# Patient Record
Sex: MALE | Race: BLACK OR AFRICAN AMERICAN | Employment: PART TIME | URBAN - METROPOLITAN AREA
[De-identification: names, ages, dates, MRNs, and addresses within clinical notes are randomized per-mention and may not be internally consistent; named-entity substitution may affect disease eponyms.]

---
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
## 2021-05-04 ENCOUNTER — HOSPITAL ENCOUNTER (EMERGENCY)
Facility: HOSPITAL | Age: 21
Discharge: HOME | End: 2021-05-04
Attending: PEDIATRICS
Payer: MEDICAID

## 2021-05-04 VITALS — DIASTOLIC BLOOD PRESSURE: 82 MMHG | HEART RATE: 72 BPM | SYSTOLIC BLOOD PRESSURE: 129 MMHG | OXYGEN SATURATION: 98 %

## 2021-05-04 DIAGNOSIS — E10.65 TYPE 1 DIABETES MELLITUS WITH HYPERGLYCEMIA (HCC): Primary | ICD-10-CM

## 2021-05-04 PROCEDURE — 82962 GLUCOSE BLOOD TEST: CPT

## 2021-05-04 PROCEDURE — 99284 EMERGENCY DEPT VISIT MOD MDM: CPT

## 2021-05-04 PROCEDURE — 85025 COMPLETE CBC W/AUTO DIFF WBC: CPT | Performed by: PEDIATRICS

## 2021-05-04 PROCEDURE — 96360 HYDRATION IV INFUSION INIT: CPT

## 2021-05-04 PROCEDURE — 81003 URINALYSIS AUTO W/O SCOPE: CPT | Performed by: PEDIATRICS

## 2021-05-04 PROCEDURE — 80053 COMPREHEN METABOLIC PANEL: CPT | Performed by: PEDIATRICS

## 2021-05-04 PROCEDURE — 82803 BLOOD GASES ANY COMBINATION: CPT | Performed by: PEDIATRICS

## 2021-05-04 RX ORDER — ONDANSETRON 4 MG/1
4 TABLET, ORALLY DISINTEGRATING ORAL EVERY 4 HOURS PRN
Qty: 10 TABLET | Refills: 0 | Status: SHIPPED | OUTPATIENT
Start: 2021-05-04 | End: 2021-05-11

## 2021-05-04 NOTE — ED PROVIDER NOTES
Patient Seen in: [facility] Emergency Department      History   Patient presents with:  Hyperglycemia    Stated Complaint: Type 1 diabetic, sugar reading high at home     HPI/Subjective:   HPI    Patient is a very pleasant 43-year-old male presentin rubs or gallops. Abdomen: Soft, nontender, nondistended. Bowel sounds present throughout. Extremities: Warm and well perfused. Dermatologic exam: No rashes or lesions. Neurologic exam: Cranial nerves 2-12 grossly intact.     Orthopedic exam: normal,fro are considered. He had an IV access established and received lab draw which did show elevated glucose initially of 400. Without treatment he came down to the mid 200s.   He has given himself 20 units prior to arrival.  We gave most 10/kg bolus and the r

## 2021-05-04 NOTE — ED INITIAL ASSESSMENT (HPI)
PT to the ED for \"high\" reading in the BG. Pt reports feeling fatigued, no other symptoms per patient.  Pt reports using 20 units of insulin @ 1300

## 2021-05-19 ENCOUNTER — HOSPITAL ENCOUNTER (EMERGENCY)
Facility: HOSPITAL | Age: 21
Discharge: HOME | End: 2021-05-20
Attending: EMERGENCY MEDICINE
Payer: MEDICAID

## 2021-05-19 VITALS
WEIGHT: 138 LBS | RESPIRATION RATE: 20 BRPM | HEIGHT: 68 IN | DIASTOLIC BLOOD PRESSURE: 93 MMHG | BODY MASS INDEX: 20.92 KG/M2 | OXYGEN SATURATION: 99 % | HEART RATE: 91 BPM | SYSTOLIC BLOOD PRESSURE: 133 MMHG | TEMPERATURE: 100 F

## 2021-05-19 DIAGNOSIS — B34.9 VIRAL SYNDROME: ICD-10-CM

## 2021-05-19 DIAGNOSIS — E10.9 TYPE 1 DIABETES MELLITUS WITHOUT COMPLICATION (HCC): Primary | ICD-10-CM

## 2021-05-19 PROCEDURE — 82803 BLOOD GASES ANY COMBINATION: CPT | Performed by: EMERGENCY MEDICINE

## 2021-05-19 PROCEDURE — 83036 HEMOGLOBIN GLYCOSYLATED A1C: CPT | Performed by: EMERGENCY MEDICINE

## 2021-05-19 PROCEDURE — 85025 COMPLETE CBC W/AUTO DIFF WBC: CPT | Performed by: EMERGENCY MEDICINE

## 2021-05-19 PROCEDURE — 86665 EPSTEIN-BARR CAPSID VCA: CPT | Performed by: EMERGENCY MEDICINE

## 2021-05-19 PROCEDURE — 96360 HYDRATION IV INFUSION INIT: CPT

## 2021-05-19 PROCEDURE — 0202U NFCT DS 22 TRGT SARS-COV-2: CPT | Performed by: EMERGENCY MEDICINE

## 2021-05-19 PROCEDURE — 86664 EPSTEIN-BARR NUCLEAR ANTIGEN: CPT | Performed by: EMERGENCY MEDICINE

## 2021-05-19 PROCEDURE — 80053 COMPREHEN METABOLIC PANEL: CPT | Performed by: EMERGENCY MEDICINE

## 2021-05-19 PROCEDURE — 82962 GLUCOSE BLOOD TEST: CPT

## 2021-05-19 PROCEDURE — 99284 EMERGENCY DEPT VISIT MOD MDM: CPT

## 2021-05-19 PROCEDURE — 86403 PARTICLE AGGLUT ANTBDY SCRN: CPT | Performed by: EMERGENCY MEDICINE

## 2021-05-20 VITALS
SYSTOLIC BLOOD PRESSURE: 138 MMHG | BODY MASS INDEX: 21 KG/M2 | OXYGEN SATURATION: 100 % | HEART RATE: 74 BPM | WEIGHT: 138 LBS | DIASTOLIC BLOOD PRESSURE: 84 MMHG | TEMPERATURE: 97 F | RESPIRATION RATE: 18 BRPM

## 2021-05-20 DIAGNOSIS — E10.65 POORLY CONTROLLED TYPE 1 DIABETES MELLITUS (HCC): Primary | ICD-10-CM

## 2021-05-20 PROCEDURE — 99284 EMERGENCY DEPT VISIT MOD MDM: CPT

## 2021-05-20 PROCEDURE — 85025 COMPLETE CBC W/AUTO DIFF WBC: CPT | Performed by: EMERGENCY MEDICINE

## 2021-05-20 PROCEDURE — 96361 HYDRATE IV INFUSION ADD-ON: CPT

## 2021-05-20 PROCEDURE — 82803 BLOOD GASES ANY COMBINATION: CPT | Performed by: EMERGENCY MEDICINE

## 2021-05-20 PROCEDURE — 96374 THER/PROPH/DIAG INJ IV PUSH: CPT

## 2021-05-20 PROCEDURE — 81001 URINALYSIS AUTO W/SCOPE: CPT | Performed by: EMERGENCY MEDICINE

## 2021-05-20 PROCEDURE — 82962 GLUCOSE BLOOD TEST: CPT

## 2021-05-20 PROCEDURE — 80053 COMPREHEN METABOLIC PANEL: CPT | Performed by: EMERGENCY MEDICINE

## 2021-05-20 RX ORDER — ONDANSETRON 2 MG/ML
4 INJECTION INTRAMUSCULAR; INTRAVENOUS ONCE
Status: COMPLETED | OUTPATIENT
Start: 2021-05-20 | End: 2021-05-20

## 2021-05-20 NOTE — ED INITIAL ASSESSMENT (HPI)
Patient here with report of BS over 400 today at 1000 with [facility].

## 2021-05-20 NOTE — ED INITIAL ASSESSMENT (HPI)
Patient has been having headache, fatigue, low grade fevers and fluctuating blood sugars. Patient is DM type1. Patient was around someone sick, but said was covid negative. Patient was seen here last week for fluctuating blood sugars.

## 2021-05-20 NOTE — ED PROVIDER NOTES
Patient Seen in: [facility] Emergency Department      History   Patient presents with:  Fever  Abnormal Result    Stated Complaint: fever, fluctuating blood sugar    HPI/Subjective:   HPI  Patient is a 67-year-old with a history of insulin-dependent photophobia. Tympanic membranes are pearly white bilaterally, with normal light reflex and normal landmarks. Oropharynx shows moist mucous membranes with no erythema or exudate. Uvula midline, no drooling, no stridor.     Neck is supple with no lymphad not tested on this assay. CBC WITH DIFFERENTIAL WITH PLATELET    Narrative: The following orders were created for panel order CBC WITH DIFFERENTIAL WITH PLATELET.   Procedure                               Abnormality         Status

## 2021-05-20 NOTE — ED PROVIDER NOTES
Patient Seen in: [facility] Emergency Department      History   Patient presents with:  Hyperglycemia    Stated Complaint: sugars high, elevated ketones at home.      HPI/Subjective:   HPI    Patient is a 29-year-old with history of insulin-dependent active and interactive. HEENT: Head is normocephalic and atraumatic. Conjunctiva are clear. No photophobia. Tympanic membranes are pearly white bilaterally, with normal light reflex and normal landmarks.     Oropharynx shows moist mucous membranes with Abnormality         Status                     ---------                               -----------         ------                     CBC W/ DIFFERENTIAL[[id number]]                              Final result                 Please view results for the

## 2021-08-12 ENCOUNTER — HOSPITAL ENCOUNTER (EMERGENCY)
Facility: HOSPITAL | Age: 21
Discharge: HOME | End: 2021-08-12
Attending: EMERGENCY MEDICINE
Payer: MEDICAID

## 2021-08-12 VITALS
SYSTOLIC BLOOD PRESSURE: 132 MMHG | WEIGHT: 145 LBS | RESPIRATION RATE: 18 BRPM | DIASTOLIC BLOOD PRESSURE: 74 MMHG | OXYGEN SATURATION: 98 % | BODY MASS INDEX: 20.76 KG/M2 | HEIGHT: 70 IN | TEMPERATURE: 98 F | HEART RATE: 96 BPM

## 2021-08-12 DIAGNOSIS — N47.1 PHIMOSIS OF PENIS: Primary | ICD-10-CM

## 2021-08-12 DIAGNOSIS — E10.65 TYPE 1 DIABETES MELLITUS WITH HYPERGLYCEMIA (HCC): ICD-10-CM

## 2021-08-12 DIAGNOSIS — N48.29 FORESKIN INFLAMMATION: ICD-10-CM

## 2021-08-12 LAB
BILIRUB UR QL STRIP.AUTO: NEGATIVE
CLARITY UR REFRACT.AUTO: CLEAR
COLOR UR AUTO: YELLOW
GLUCOSE UR STRIP.AUTO-MCNC: >=500 MG/DL
LEUKOCYTE ESTERASE UR QL STRIP.AUTO: NEGATIVE
NITRITE UR QL STRIP.AUTO: NEGATIVE
PH UR STRIP.AUTO: 8 [PH] (ref 5–8)
PROT UR STRIP.AUTO-MCNC: 100 MG/DL
RBC UR QL AUTO: NEGATIVE
SP GR UR STRIP.AUTO: 1.03 (ref 1–1.03)
UROBILINOGEN UR STRIP.AUTO-MCNC: 2 MG/DL

## 2021-08-12 PROCEDURE — 99283 EMERGENCY DEPT VISIT LOW MDM: CPT

## 2021-08-12 PROCEDURE — 81001 URINALYSIS AUTO W/SCOPE: CPT | Performed by: EMERGENCY MEDICINE

## 2021-08-12 RX ORDER — INSULIN LISPRO 100 [IU]/ML
INJECTION, SOLUTION INTRAVENOUS; SUBCUTANEOUS AS NEEDED
COMMUNITY

## 2021-08-12 RX ORDER — CEPHALEXIN 500 MG/1
500 CAPSULE ORAL 3 TIMES DAILY
Qty: 30 CAPSULE | Refills: 0 | Status: SHIPPED | OUTPATIENT
Start: 2021-08-12 | End: 2021-08-22

## 2021-08-13 NOTE — ED PROVIDER NOTES
Patient Seen in: [facility] Emergency Department      History   Patient presents with:  Eval-G    Stated Complaint: testicle swelling    HPI/Subjective:   HPI    Hu Pratt is a 52-year-old who presents for evaluation of swelling at the foreskin.   He st Current:BP (!) 158/96   Pulse 111   Temp 97.9 °F (36.6 °C) (Temporal)   Resp 20   Ht 177.8 cm (5' 10\")   Wt 65.8 kg   SpO2 98%   BMI 20.81 kg/m²         Physical Exam    General: Well appearing adult in no acute distress.   HEENT: Atraumatic, normoce urinalysis showed evidence of poor control of his diabetes. He had glucose as well as trace ketones. He was instructed again on on diabetes care.   I have instructed him to check his glucose at least 3 times per day and administer subcutaneous insulin per

## 2021-08-13 NOTE — ED INITIAL ASSESSMENT (HPI)
Pt c/o \"swelling to penis for a day. \" Pt denies urinary sxs, denies abnormal discharge, \"Just hard to clean myself. \" Reports same problem a year ago where he saw a urologist. Pt denies fever

## 2022-08-22 ENCOUNTER — APPOINTMENT (OUTPATIENT)
Dept: GENERAL RADIOLOGY | Facility: HOSPITAL | Age: 22
End: 2022-08-22
Attending: HOSPITALIST
Payer: MEDICAID

## 2022-08-22 ENCOUNTER — HOSPITAL ENCOUNTER (OUTPATIENT)
Facility: HOSPITAL | Age: 22
Setting detail: OBSERVATION
Discharge: HOME | End: 2022-08-22
Attending: EMERGENCY MEDICINE | Admitting: HOSPITALIST
Payer: MEDICAID

## 2022-08-22 VITALS
SYSTOLIC BLOOD PRESSURE: 121 MMHG | HEIGHT: 68 IN | HEART RATE: 80 BPM | DIASTOLIC BLOOD PRESSURE: 66 MMHG | TEMPERATURE: 98 F | RESPIRATION RATE: 17 BRPM | OXYGEN SATURATION: 100 % | WEIGHT: 145 LBS | BODY MASS INDEX: 21.98 KG/M2

## 2022-08-22 DIAGNOSIS — E87.6 HYPOKALEMIA: ICD-10-CM

## 2022-08-22 DIAGNOSIS — E87.2 METABOLIC ACIDOSIS: ICD-10-CM

## 2022-08-22 DIAGNOSIS — E87.1 HYPONATREMIA: ICD-10-CM

## 2022-08-22 DIAGNOSIS — E10.65 UNCONTROLLED TYPE 1 DIABETES MELLITUS WITH HYPERGLYCEMIA (HCC): Primary | ICD-10-CM

## 2022-08-22 DIAGNOSIS — R73.9 HYPERGLYCEMIA: ICD-10-CM

## 2022-08-22 PROBLEM — E87.20 METABOLIC ACIDOSIS: Status: ACTIVE | Noted: 2022-08-22

## 2022-08-22 LAB
ALBUMIN SERPL-MCNC: 2.9 G/DL (ref 3.4–5)
ALBUMIN SERPL-MCNC: 3.5 G/DL (ref 3.4–5)
ALBUMIN/GLOB SERPL: 0.9 {RATIO} (ref 1–2)
ALBUMIN/GLOB SERPL: 1 {RATIO} (ref 1–2)
ALP LIVER SERPL-CCNC: 76 U/L
ALP LIVER SERPL-CCNC: 85 U/L
ALT SERPL-CCNC: 36 U/L
ALT SERPL-CCNC: 38 U/L
ANION GAP SERPL CALC-SCNC: 14 MMOL/L (ref 0–18)
ANION GAP SERPL CALC-SCNC: 7 MMOL/L (ref 0–18)
AST SERPL-CCNC: 25 U/L (ref 15–37)
AST SERPL-CCNC: 25 U/L (ref 15–37)
BASE EXCESS BLDV CALC-SCNC: -4.5 MMOL/L
BASOPHILS # BLD AUTO: 0.08 X10(3) UL (ref 0–0.2)
BASOPHILS # BLD AUTO: 0.09 X10(3) UL (ref 0–0.2)
BASOPHILS NFR BLD AUTO: 0.6 %
BASOPHILS NFR BLD AUTO: 0.7 %
BILIRUB SERPL-MCNC: 0.9 MG/DL (ref 0.1–2)
BILIRUB SERPL-MCNC: 1.1 MG/DL (ref 0.1–2)
BILIRUB UR QL STRIP.AUTO: NEGATIVE
BUN BLD-MCNC: 17 MG/DL (ref 7–18)
BUN BLD-MCNC: 24 MG/DL (ref 7–18)
CALCIUM BLD-MCNC: 8.5 MG/DL (ref 8.5–10.1)
CALCIUM BLD-MCNC: 9.1 MG/DL (ref 8.5–10.1)
CHLORIDE SERPL-SCNC: 101 MMOL/L (ref 98–112)
CHLORIDE SERPL-SCNC: 106 MMOL/L (ref 98–112)
CLARITY UR REFRACT.AUTO: CLEAR
CO2 SERPL-SCNC: 20 MMOL/L (ref 21–32)
CO2 SERPL-SCNC: 26 MMOL/L (ref 21–32)
COLOR UR AUTO: YELLOW
CREAT BLD-MCNC: 1 MG/DL
CREAT BLD-MCNC: 1.43 MG/DL
EOSINOPHIL # BLD AUTO: 0.24 X10(3) UL (ref 0–0.7)
EOSINOPHIL # BLD AUTO: 0.41 X10(3) UL (ref 0–0.7)
EOSINOPHIL NFR BLD AUTO: 1.8 %
EOSINOPHIL NFR BLD AUTO: 3.3 %
ERYTHROCYTE [DISTWIDTH] IN BLOOD BY AUTOMATED COUNT: 12.1 %
ERYTHROCYTE [DISTWIDTH] IN BLOOD BY AUTOMATED COUNT: 12.3 %
EST. AVERAGE GLUCOSE BLD GHB EST-MCNC: 318 MG/DL (ref 68–126)
GFR SERPLBLD BASED ON 1.73 SQ M-ARVRAT: 110 ML/MIN/1.73M2 (ref 60–?)
GFR SERPLBLD BASED ON 1.73 SQ M-ARVRAT: 71 ML/MIN/1.73M2 (ref 60–?)
GLOBULIN PLAS-MCNC: 3.2 G/DL (ref 2.8–4.4)
GLOBULIN PLAS-MCNC: 3.6 G/DL (ref 2.8–4.4)
GLUCOSE BLD-MCNC: 107 MG/DL (ref 70–99)
GLUCOSE BLD-MCNC: 138 MG/DL (ref 70–99)
GLUCOSE BLD-MCNC: 154 MG/DL (ref 70–99)
GLUCOSE BLD-MCNC: 216 MG/DL (ref 70–99)
GLUCOSE BLD-MCNC: 295 MG/DL (ref 70–99)
GLUCOSE BLD-MCNC: 321 MG/DL (ref 70–99)
GLUCOSE BLD-MCNC: 63 MG/DL (ref 70–99)
GLUCOSE BLD-MCNC: 66 MG/DL (ref 70–99)
GLUCOSE BLD-MCNC: 85 MG/DL (ref 70–99)
GLUCOSE UR STRIP.AUTO-MCNC: 500 MG/DL
HBA1C MFR BLD: 12.7 % (ref ?–5.7)
HCO3 BLDV-SCNC: 20.7 MEQ/L (ref 22–26)
HCT VFR BLD AUTO: 40.7 %
HCT VFR BLD AUTO: 47.1 %
HGB BLD-MCNC: 13.3 G/DL
HGB BLD-MCNC: 15.5 G/DL
IMM GRANULOCYTES # BLD AUTO: 0.04 X10(3) UL (ref 0–1)
IMM GRANULOCYTES # BLD AUTO: 0.05 X10(3) UL (ref 0–1)
IMM GRANULOCYTES NFR BLD: 0.3 %
IMM GRANULOCYTES NFR BLD: 0.4 %
KETONES UR STRIP.AUTO-MCNC: 80 MG/DL
LACTATE BLD-SCNC: 7.2 MMOL/L (ref 0.5–2)
LACTATE SERPL-SCNC: 1.3 MMOL/L (ref 0.4–2)
LACTATE SERPL-SCNC: 3.3 MMOL/L (ref 0.4–2)
LEUKOCYTE ESTERASE UR QL STRIP.AUTO: NEGATIVE
LYMPHOCYTES # BLD AUTO: 3.22 X10(3) UL (ref 1–4)
LYMPHOCYTES # BLD AUTO: 3.73 X10(3) UL (ref 1–4)
LYMPHOCYTES NFR BLD AUTO: 24.5 %
LYMPHOCYTES NFR BLD AUTO: 29.7 %
MAGNESIUM SERPL-MCNC: 2.3 MG/DL (ref 1.6–2.6)
MCH RBC QN AUTO: 30.8 PG (ref 26–34)
MCH RBC QN AUTO: 30.9 PG (ref 26–34)
MCHC RBC AUTO-ENTMCNC: 32.7 G/DL (ref 31–37)
MCHC RBC AUTO-ENTMCNC: 32.9 G/DL (ref 31–37)
MCV RBC AUTO: 94 FL
MCV RBC AUTO: 94.2 FL
MONOCYTES # BLD AUTO: 1.18 X10(3) UL (ref 0.1–1)
MONOCYTES # BLD AUTO: 1.25 X10(3) UL (ref 0.1–1)
MONOCYTES NFR BLD AUTO: 9.4 %
MONOCYTES NFR BLD AUTO: 9.5 %
NEUTROPHILS # BLD AUTO: 7.1 X10 (3) UL (ref 1.5–7.7)
NEUTROPHILS # BLD AUTO: 7.1 X10(3) UL (ref 1.5–7.7)
NEUTROPHILS # BLD AUTO: 8.27 X10 (3) UL (ref 1.5–7.7)
NEUTROPHILS # BLD AUTO: 8.27 X10(3) UL (ref 1.5–7.7)
NEUTROPHILS NFR BLD AUTO: 56.7 %
NEUTROPHILS NFR BLD AUTO: 63.1 %
NITRITE UR QL STRIP.AUTO: NEGATIVE
OSMOLALITY SERPL CALC.SUM OF ELEC: 292 MOSM/KG (ref 275–295)
OSMOLALITY SERPL CALC.SUM OF ELEC: 295 MOSM/KG (ref 275–295)
OXYHGB MFR BLDV: 71.3 % (ref 72–78)
PCO2 BLDV: 36 MM HG (ref 38–50)
PH BLDV: 7.36 [PH] (ref 7.33–7.43)
PH UR STRIP.AUTO: 5 [PH] (ref 5–8)
PLATELET # BLD AUTO: 303 10(3)UL (ref 150–450)
PLATELET # BLD AUTO: 321 10(3)UL (ref 150–450)
PO2 BLDV: 42 MM HG (ref 30–50)
POTASSIUM SERPL-SCNC: 3.3 MMOL/L (ref 3.5–5.1)
POTASSIUM SERPL-SCNC: 3.5 MMOL/L (ref 3.5–5.1)
PROT SERPL-MCNC: 6.1 G/DL (ref 6.4–8.2)
PROT SERPL-MCNC: 7.1 G/DL (ref 6.4–8.2)
PROT UR STRIP.AUTO-MCNC: NEGATIVE MG/DL
RBC # BLD AUTO: 4.32 X10(6)UL
RBC # BLD AUTO: 5.01 X10(6)UL
RBC UR QL AUTO: NEGATIVE
SARS-COV-2 RNA RESP QL NAA+PROBE: NOT DETECTED
SODIUM SERPL-SCNC: 135 MMOL/L (ref 136–145)
SODIUM SERPL-SCNC: 139 MMOL/L (ref 136–145)
SP GR UR STRIP.AUTO: 1.01 (ref 1–1.03)
UROBILINOGEN UR STRIP.AUTO-MCNC: 0.2 MG/DL
WBC # BLD AUTO: 12.5 X10(3) UL (ref 4–11)
WBC # BLD AUTO: 13.1 X10(3) UL (ref 4–11)

## 2022-08-22 PROCEDURE — 99243 OFF/OP CNSLTJ NEW/EST LOW 30: CPT | Performed by: CLINICAL NURSE SPECIALIST

## 2022-08-22 PROCEDURE — 99220 INITIAL OBSERVATION CARE,LEVL III: CPT | Performed by: HOSPITALIST

## 2022-08-22 PROCEDURE — 71045 X-RAY EXAM CHEST 1 VIEW: CPT | Performed by: HOSPITALIST

## 2022-08-22 RX ORDER — NICOTINE POLACRILEX 4 MG
30 LOZENGE BUCCAL
Status: DISCONTINUED | OUTPATIENT
Start: 2022-08-22 | End: 2022-08-22

## 2022-08-22 RX ORDER — MELATONIN
3 NIGHTLY PRN
Status: DISCONTINUED | OUTPATIENT
Start: 2022-08-22 | End: 2022-08-22

## 2022-08-22 RX ORDER — SENNOSIDES 8.6 MG
17.2 TABLET ORAL NIGHTLY PRN
Status: DISCONTINUED | OUTPATIENT
Start: 2022-08-22 | End: 2022-08-22

## 2022-08-22 RX ORDER — NICOTINE POLACRILEX 4 MG
15 LOZENGE BUCCAL ONCE
Status: COMPLETED | OUTPATIENT
Start: 2022-08-22 | End: 2022-08-22

## 2022-08-22 RX ORDER — BLOOD-GLUCOSE METER
EACH MISCELLANEOUS
Qty: 1 EACH | Refills: 0 | Status: SHIPPED | OUTPATIENT
Start: 2022-08-22 | End: 2022-08-24 | Stop reason: CLARIF

## 2022-08-22 RX ORDER — INSULIN GLARGINE 100 [IU]/ML
5 INJECTION, SOLUTION SUBCUTANEOUS NIGHTLY
Qty: 1.5 ML | Refills: 0 | Status: SHIPPED | OUTPATIENT
Start: 2022-08-22 | End: 2022-09-21

## 2022-08-22 RX ORDER — BISACODYL 10 MG
10 SUPPOSITORY, RECTAL RECTAL
Status: DISCONTINUED | OUTPATIENT
Start: 2022-08-22 | End: 2022-08-22

## 2022-08-22 RX ORDER — PROCHLORPERAZINE EDISYLATE 5 MG/ML
5 INJECTION INTRAMUSCULAR; INTRAVENOUS EVERY 8 HOURS PRN
Status: DISCONTINUED | OUTPATIENT
Start: 2022-08-22 | End: 2022-08-22

## 2022-08-22 RX ORDER — DEXTROSE MONOHYDRATE 25 G/50ML
50 INJECTION, SOLUTION INTRAVENOUS
Status: DISCONTINUED | OUTPATIENT
Start: 2022-08-22 | End: 2022-08-22

## 2022-08-22 RX ORDER — SODIUM CHLORIDE 9 MG/ML
INJECTION, SOLUTION INTRAVENOUS CONTINUOUS
Status: DISCONTINUED | OUTPATIENT
Start: 2022-08-22 | End: 2022-08-22

## 2022-08-22 RX ORDER — POLYETHYLENE GLYCOL 3350 17 G/17G
17 POWDER, FOR SOLUTION ORAL DAILY PRN
Status: DISCONTINUED | OUTPATIENT
Start: 2022-08-22 | End: 2022-08-22

## 2022-08-22 RX ORDER — INSULIN LISPRO 100 [IU]/ML
INJECTION, SOLUTION INTRAVENOUS; SUBCUTANEOUS
Qty: 30 EACH | Refills: 0 | Status: SHIPPED | OUTPATIENT
Start: 2022-08-22

## 2022-08-22 RX ORDER — NICOTINE POLACRILEX 4 MG
30 LOZENGE BUCCAL ONCE
Status: COMPLETED | OUTPATIENT
Start: 2022-08-22 | End: 2022-08-22

## 2022-08-22 RX ORDER — INSULIN ASPART 100 [IU]/ML
0.1 INJECTION, SOLUTION INTRAVENOUS; SUBCUTANEOUS ONCE
Status: COMPLETED | OUTPATIENT
Start: 2022-08-22 | End: 2022-08-22

## 2022-08-22 RX ORDER — PEN NEEDLE, DIABETIC 32GX 5/32"
NEEDLE, DISPOSABLE MISCELLANEOUS
Qty: 50 EACH | Refills: 6 | Status: SHIPPED | OUTPATIENT
Start: 2022-08-22 | End: 2022-08-24 | Stop reason: CLARIF

## 2022-08-22 RX ORDER — ONDANSETRON 2 MG/ML
4 INJECTION INTRAMUSCULAR; INTRAVENOUS EVERY 6 HOURS PRN
Status: DISCONTINUED | OUTPATIENT
Start: 2022-08-22 | End: 2022-08-22

## 2022-08-22 RX ORDER — SODIUM PHOSPHATE, DIBASIC AND SODIUM PHOSPHATE, MONOBASIC 7; 19 G/133ML; G/133ML
1 ENEMA RECTAL ONCE AS NEEDED
Status: DISCONTINUED | OUTPATIENT
Start: 2022-08-22 | End: 2022-08-22

## 2022-08-22 RX ORDER — LANCETS
1 EACH MISCELLANEOUS
Qty: 100 EACH | Refills: 6 | Status: SHIPPED | OUTPATIENT
Start: 2022-08-22 | End: 2022-08-24 | Stop reason: CLARIF

## 2022-08-22 RX ORDER — NICOTINE POLACRILEX 4 MG
15 LOZENGE BUCCAL
Status: DISCONTINUED | OUTPATIENT
Start: 2022-08-22 | End: 2022-08-22

## 2022-08-22 RX ORDER — ENOXAPARIN SODIUM 100 MG/ML
40 INJECTION SUBCUTANEOUS DAILY
Status: DISCONTINUED | OUTPATIENT
Start: 2022-08-22 | End: 2022-08-22

## 2022-08-22 RX ORDER — ACETAMINOPHEN 500 MG
500 TABLET ORAL EVERY 4 HOURS PRN
Status: DISCONTINUED | OUTPATIENT
Start: 2022-08-22 | End: 2022-08-22

## 2022-08-22 NOTE — PLAN OF CARE
Pt discharged home accompanied by family members. Prescriptions and discharge instructions given with pt verbalizing understanding.

## 2022-08-22 NOTE — PROGRESS NOTES
Pt seen and examined  Pt stable  Monitor BG  A1c 12.7  DM education  Pt would benefit from Endocrine but concerned with lack of insurance and personal issues  TCC for f/u

## 2022-08-22 NOTE — ED INITIAL ASSESSMENT (HPI)
Type 1 DM, used last of humalog home insulin today. Has been under treating self to have insulin last longer, has not had an endocrinologist since January, out of Insulin RX.  Blood sugar this evening reading HIGH

## 2022-08-22 NOTE — ED QUICK NOTES
Orders for admission, patient is aware of plan and ready to go upstairs. Any questions, please call ED RN Armaan Carr at extension [phone].     Patient Covid vaccination status: Unvaccinated     COVID Test Ordered in ED: Rapid SARS-CoV-2 by PCR    COVID Suspicion at Admission: N/A    Running Infusions:  0.9% NS    Mental Status/LOC at time of transport: A & O x 3    Other pertinent information:   CIWA score: N/A   NIH score:  N/A

## 2022-08-22 NOTE — PLAN OF CARE
Patient alert and oriented, admitted from ED for high sugar. Pt ran out of insulin. Pt feels much better, IVF infusing at 125 ml/hr. Family at the bedside. No complaints of pain or SOB. Bed alarm is on, call light within reach, will continue to monitor.      Problem: Diabetes/Glucose Control  Goal: Glucose maintained within prescribed range  Description: INTERVENTIONS:  - Monitor Blood Glucose as ordered  - Assess for signs and symptoms of hyperglycemia and hypoglycemia  - Administer ordered medications to maintain glucose within target range  - Assess barriers to adequate nutritional intake and initiate nutrition consult as needed  - Instruct patient on self management of diabetes  Outcome: Progressing

## 2022-08-22 NOTE — ED QUICK NOTES
Per charge RN Yara Mensah, nurse for [phone] is Que Mora RN. Charge aware patient is coming to floor. Charge notified both ED MD and hospitalist aware of lactic acid result.

## 2022-08-22 NOTE — CM/SW NOTE
SW called [phone] and left a voicemail requesting that they reach out to patient regarding Medicaid application. SW will continue to follow for plan of care changes and remain available for any additional DC needs or concerns.      Tona Dailey MSW, LSW  Discharge Planner   [phone]

## 2022-08-23 ENCOUNTER — PATIENT OUTREACH (OUTPATIENT)
Dept: CASE MANAGEMENT | Age: 22
End: 2022-08-23

## 2022-08-23 NOTE — CM/SW NOTE
Patient is listed as self pay. Per [address], Patient is over income for Medicaid and does not appear to qualify for any other programs. Resources for VNA, low cost medications, and patient assistance programs placed on avs. Diabetic apn to see.     Gretchen Kelsey, RN,   [id number]

## 2022-08-23 NOTE — PROGRESS NOTES
GIGINUNO for post hospital follow up. [facility] contact information provided as well as [facility] office number, [phone].

## 2022-08-24 ENCOUNTER — OFFICE VISIT (OUTPATIENT)
Dept: INTERNAL MEDICINE CLINIC | Facility: CLINIC | Age: 22
End: 2022-08-24
Payer: MEDICAID

## 2022-08-24 VITALS
SYSTOLIC BLOOD PRESSURE: 123 MMHG | BODY MASS INDEX: 20.44 KG/M2 | HEIGHT: 69 IN | OXYGEN SATURATION: 98 % | RESPIRATION RATE: 16 BRPM | HEART RATE: 65 BPM | TEMPERATURE: 97 F | DIASTOLIC BLOOD PRESSURE: 73 MMHG | WEIGHT: 138 LBS

## 2022-08-24 DIAGNOSIS — E10.65 UNCONTROLLED TYPE 1 DIABETES MELLITUS WITH HYPERGLYCEMIA (HCC): Primary | ICD-10-CM

## 2022-08-24 DIAGNOSIS — N17.9 AKI (ACUTE KIDNEY INJURY) (HCC): ICD-10-CM

## 2022-08-24 RX ORDER — PEN NEEDLE, DIABETIC 30 GX3/16"
1 NEEDLE, DISPOSABLE MISCELLANEOUS 4 TIMES DAILY
Qty: 100 EACH | Refills: 0 | Status: SHIPPED | OUTPATIENT
Start: 2022-08-24

## 2022-08-24 RX ORDER — FLASH GLUCOSE SENSOR
1 KIT MISCELLANEOUS
COMMUNITY

## 2022-08-24 RX ORDER — IBUPROFEN 600 MG/1
1 TABLET ORAL AS DIRECTED
Qty: 1 KIT | Refills: 0 | Status: SHIPPED | OUTPATIENT
Start: 2022-08-24

## 2022-08-24 NOTE — PATIENT INSTRUCTIONS
Patient Instructions:    1. Someone from the VNA will contact you to assist in establishing care until you have insurance. If you need assistance finding a Primary Care once your insurance is in effect, please contact us here at the clinic.

## 2022-08-30 ENCOUNTER — OFFICE VISIT (OUTPATIENT)
Dept: ENDOCRINOLOGY CLINIC | Facility: CLINIC | Age: 22
End: 2022-08-30
Payer: MEDICAID

## 2022-08-30 VITALS
HEART RATE: 95 BPM | DIASTOLIC BLOOD PRESSURE: 80 MMHG | RESPIRATION RATE: 16 BRPM | WEIGHT: 135 LBS | SYSTOLIC BLOOD PRESSURE: 118 MMHG | OXYGEN SATURATION: 98 % | HEIGHT: 69 IN | BODY MASS INDEX: 19.99 KG/M2

## 2022-08-30 DIAGNOSIS — E10.65 UNCONTROLLED TYPE 1 DIABETES MELLITUS WITH HYPERGLYCEMIA (HCC): Primary | ICD-10-CM

## 2022-08-30 LAB
CREAT UR-SCNC: 174 MG/DL
GLUCOSE BLOOD: 169
MICROALBUMIN UR-MCNC: 0.68 MG/DL
MICROALBUMIN/CREAT 24H UR-RTO: 3.9 UG/MG (ref ?–30)
TEST STRIP LOT #: NORMAL NUMERIC

## 2022-08-30 PROCEDURE — 99213 OFFICE O/P EST LOW 20 MIN: CPT | Performed by: NURSE PRACTITIONER

## 2022-08-30 PROCEDURE — 82043 UR ALBUMIN QUANTITATIVE: CPT | Performed by: NURSE PRACTITIONER

## 2022-08-30 PROCEDURE — 82947 ASSAY GLUCOSE BLOOD QUANT: CPT | Performed by: NURSE PRACTITIONER

## 2022-08-30 PROCEDURE — 82570 ASSAY OF URINE CREATININE: CPT | Performed by: NURSE PRACTITIONER

## 2022-08-30 RX ORDER — INSULIN LISPRO 100 [IU]/ML
INJECTION, SOLUTION SUBCUTANEOUS
Qty: 15 ML | Refills: 0 | Status: SHIPPED | OUTPATIENT
Start: 2022-08-30

## 2022-08-30 RX ORDER — PEN NEEDLE, DIABETIC, SAFETY 29GX 5/16"
NEEDLE, DISPOSABLE MISCELLANEOUS
Qty: 100 EACH | Refills: 2 | Status: SHIPPED | OUTPATIENT
Start: 2022-08-30

## 2022-08-30 RX ORDER — INSULIN GLARGINE 100 [IU]/ML
15 INJECTION, SOLUTION SUBCUTANEOUS NIGHTLY
Qty: 15 ML | Refills: 0 | Status: SHIPPED | OUTPATIENT
Start: 2022-08-30

## 2022-09-01 ENCOUNTER — TELEPHONE (OUTPATIENT)
Dept: ENDOCRINOLOGY CLINIC | Facility: CLINIC | Age: 22
End: 2022-09-01

## 2022-09-01 NOTE — TELEPHONE ENCOUNTER
Mother called patient was approved for Medicaid which ill become [facility] and  Will be come reto from august 1. Will have all the paper work by [address] 5 or 6 and will call back the office   with the numbers.

## 2022-09-13 ENCOUNTER — TELEPHONE (OUTPATIENT)
Dept: ENDOCRINOLOGY CLINIC | Facility: CLINIC | Age: 22
End: 2022-09-13

## 2022-09-13 NOTE — TELEPHONE ENCOUNTER
Pt's mom called stating patient is having extreme differences in blood sugar, both highs and lows. Pt is current with medicaid. Needs to see educator. Booked with Jina Juni tomorrow at 4. She will verify with patient that he can make it. Will call office if today or tomorrow am if issues. One of the insulins is making him \"not feel good\", had to leave work yesterday due to lows. Bobbi report printed.

## 2022-09-14 ENCOUNTER — TELEPHONE (OUTPATIENT)
Dept: ENDOCRINOLOGY CLINIC | Facility: CLINIC | Age: 22
End: 2022-09-14

## 2022-09-14 ENCOUNTER — DIABETIC EDUCATION (OUTPATIENT)
Dept: ENDOCRINOLOGY CLINIC | Facility: CLINIC | Age: 22
End: 2022-09-14
Payer: MEDICAID

## 2022-09-14 DIAGNOSIS — E10.65 POOR CONTROL TYPE I DIABETES MELLITUS (HCC): Primary | ICD-10-CM

## 2022-09-14 PROCEDURE — 99211 OFF/OP EST MAY X REQ PHY/QHP: CPT | Performed by: DIETITIAN, REGISTERED

## 2022-09-14 NOTE — TELEPHONE ENCOUNTER
Pt had 4 pm appt today with Jade Anderson. Upon check in, pt stated to front staff his BG was \"dropping rapidly\". Pt brought into room and BG was checked. Result 243. Pt states he gave about 10 units at 1 pm as a correction dose, he had not eaten. He reports having scrambled eggs earlier in the day. Consulted with QAMAR Gleason if further assessment was needed. Juice and bagel were offered, pt refused but items were left in the room. Jade Ridleys educator was notified of situation prior to appt. LOT: [id number] EXP: 01/23/2023  hemocue glucose order pended in visit.

## 2022-09-14 NOTE — PROGRESS NOTES
Diabetes Education Follow-up Note    Referring Provider: Troy Marcum   Start time: 4:00  End time: 8;200    Pt with T1DB x 11 years. Here with mom Maria E Hayward. Has been on T-Slim and Medtronic in the past. Would like OP. His main issue is he goes all day w/o eating and has been having such elevated BG's that when he takes either his basal or his rapid-acting insulins, he does not feel well. Diabetes Medications:     Basaglar once/day 5 units/day. He notices what seems like mood changes when he takes it. We discussed it the mood changes may be BG's returning to a lower level. Humalog TID before meals: he takes 1 unit for every 50 mg/dL above 200. He takes 1 unit if 150-200   2 units if 201-250    Uses a syringe with his Admelog as he is saying the pen needles are too short (4mm). Gave him samples of 6mm pen needles. He has 8mm pen needles at the pharmacy. Discussed possibly not getting an accurate dose with syringe. BG was low the other day, hadn't had food all day. Was low when he arrived today. Had eggs today. Diet:   Breakfast:  4 eggs with sausage and 4 pieces of chadwick. Drinks water. Discussed eventual goal to reduce saturated fat and sodium. Snacks if he can but usually doesn't. Maybe a breadstick (works at [employer]). Lunch: may have a salad or usually skips. Salad: no protein but maybe some croutons    If he works days he gets off work at [employer]: orders joo hair pasta with meat sauce and brings it home to eat. Discussed having trail mix, protein bars, peanut butter sandwiches with him so he can snack on carb/protein while at work when there is no time to eat. A1C: 12.7% 8/22/22   Discussed target A1C 6.5-6.9%   Discussed target BG's. Diet: Education:  Discussed macronutrient balance: reduce starch, include lean protein and non-starchy vegetables, also fruit, yogurt and milk. Discussed heart healthy aspects of diet including low saturated fat/sodium, high fiber.     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 Works mostly mornings 9a-5pm one day this week worked a double. BG's: Narcisa Hilliard report from yesterday sent to scan. CGM Analysis of data: dates  8/31-9/13/22   Average glucose : 272 mg/dl     Glucose Management Indicator (GMI): 9.8% (last A1C 12.7% 8/22/22)  Glucose Variability (%CV target <36%): 41.2%    54% time above 250 mg/dl  (recommended: <5%)  20% time above 180mg /dl  (recommended: <25%)  25% time in target range:  mg/dl (recommended 70%)  1% time below target range: 70mg/dl (recommended <5%)  0% time below severe low less than 55 mg/dl (recommended <1%)    Plan: CGMS download--copy sent to scan for view in media tab. Hypoglycemia?: reviewed Rule of 15 for treating hypoglycemia. Patient set diabetes self-management goals: carbs/protein every 3-4 hours throughout the day. Patient is willing to make lifestyle changes to improve blood glucose control. Written materials provided for all topics covered. Patient verbalized understanding and has no further questions at this time. Thank you for the referral.     Follow-up Plan: diabetes educator 9/28 and scheduled pt to see diabetes provider 10/5. Pt to contact diabetes center with questions/concerns.     Bertha Donaldson MS RD IJEOMAN [facility]

## 2022-09-20 LAB
GLUCOSE BLOOD: 243
TEST STRIP LOT #: NORMAL NUMERIC

## 2022-09-28 ENCOUNTER — TELEPHONE (OUTPATIENT)
Dept: ENDOCRINOLOGY CLINIC | Facility: CLINIC | Age: 22
End: 2022-09-28

## 2022-09-28 NOTE — TELEPHONE ENCOUNTER
No Show for diabetes education follow-up. Called pt: LMTCB and resched. Called mom Margarita - spoke with both pt and mom. See Adriana Brown report below. 1) they are trying to get him [employer]. She is having a hard time getting her calls returned. 2) they would like him on a pump. He did better in the past on a pump    3) Pen needles: they went to [facility] and were told they had no order. Informed pt we show they were ordered on 8/30 with 2 refills and \"receipt confirmed by pharmacy\". Pt is using syringes to extract insulin from his pens. Discussed how this is less accurate. Gave him sample pen needles two weeks ago. Margarita will go to [employer] and check again & will have them call the office if issues. 4) They request a reminder text for appts. Told them text not possible but will call them both/send emails/[employer] messages so they don't miss diabetes provider appt Wed 10/5 @ 4:45    5) Basaglar: he hasn't taken in over a week. He says he drops. Instructed him to reduce 5 units to 3. He said he drops even with two units. 6) Admelog: he sometimes takes 4-5 x's/day if BG's elevated, even within 4 hours. Instructed him to wait x 4 hours to avoid stacking insulin. He states he gets a rash and slight swelling after each Admelog injection but both resolve after an hour and it has not kept him from taking it. Denies itching. 7) he has temperature and runny nose. Margarita aware this 'doesn't help BG's'. Discussed how, at these BG levels,he cannot be feeling his best and this increases the risk for complications of diabetes as he has been uncontrolled for at least 18 months. Margarita says it's been longer than that. 8) Diet: Margarita purchased trail mix and protein bars. Pt states he has no time to eat. Informed him he needs to speak to manager - should be able to eat. He mostly eats salads. Instructed him to eat carbs and protein as discussed two weeks ago.      Sensor type: Bobbi 2  CGM Analysis of data: dates 9/15-9/28/22   Average glucose : 283 mg/dl     Glucose Management Indicator (GMI): 10.1%  Last A1C 12.7$ 8/22/22  Glucose Variability (%CV target <36%): 34.7%    66% time above 250 mg/dl  (last 54%)  16% time above 180mg /dl (last 20%)  17% time in target range:  mg/dl (last 25%)  1% time below target range: 70mg/dl (same as last time)  0% time below severe low less than 55 mg/dl (same as last time)    Plan: CGMS download--copy sent to scan for view in media tab.

## 2022-10-04 ENCOUNTER — TELEPHONE (OUTPATIENT)
Dept: ENDOCRINOLOGY CLINIC | Facility: CLINIC | Age: 22
End: 2022-10-04

## 2022-10-06 ENCOUNTER — DIABETIC EDUCATION (OUTPATIENT)
Dept: ENDOCRINOLOGY CLINIC | Facility: CLINIC | Age: 22
End: 2022-10-06
Payer: MEDICAID

## 2022-10-06 ENCOUNTER — TELEPHONE (OUTPATIENT)
Dept: ENDOCRINOLOGY CLINIC | Facility: CLINIC | Age: 22
End: 2022-10-06

## 2022-10-06 ENCOUNTER — OFFICE VISIT (OUTPATIENT)
Dept: ENDOCRINOLOGY CLINIC | Facility: CLINIC | Age: 22
End: 2022-10-06
Payer: MEDICAID

## 2022-10-06 VITALS
HEART RATE: 81 BPM | SYSTOLIC BLOOD PRESSURE: 116 MMHG | RESPIRATION RATE: 18 BRPM | BODY MASS INDEX: 21 KG/M2 | WEIGHT: 143.63 LBS | OXYGEN SATURATION: 97 % | DIASTOLIC BLOOD PRESSURE: 76 MMHG

## 2022-10-06 DIAGNOSIS — E10.65 UNCONTROLLED TYPE 1 DIABETES MELLITUS WITH HYPERGLYCEMIA (HCC): Primary | ICD-10-CM

## 2022-10-06 PROCEDURE — 95251 CONT GLUC MNTR ANALYSIS I&R: CPT | Performed by: NURSE PRACTITIONER

## 2022-10-06 PROCEDURE — 95249 CONT GLUC MNTR PT PROV EQP: CPT | Performed by: DIETITIAN, REGISTERED

## 2022-10-06 PROCEDURE — 3074F SYST BP LT 130 MM HG: CPT | Performed by: NURSE PRACTITIONER

## 2022-10-06 PROCEDURE — 99214 OFFICE O/P EST MOD 30 MIN: CPT | Performed by: NURSE PRACTITIONER

## 2022-10-06 PROCEDURE — 3078F DIAST BP <80 MM HG: CPT | Performed by: NURSE PRACTITIONER

## 2022-10-06 RX ORDER — BLOOD-GLUCOSE SENSOR
1 EACH MISCELLANEOUS
Qty: 9 EACH | Refills: 3 | Status: SHIPPED | OUTPATIENT
Start: 2022-10-06

## 2022-10-06 RX ORDER — BLOOD-GLUCOSE TRANSMITTER
1 EACH MISCELLANEOUS
Qty: 1 EACH | Refills: 3 | Status: SHIPPED | OUTPATIENT
Start: 2022-10-06

## 2022-10-06 RX ORDER — INSULIN PMP CART,AUT,G6/7,CNTR
1 EACH SUBCUTANEOUS
Qty: 10 EACH | Refills: 5 | Status: SHIPPED | OUTPATIENT
Start: 2022-10-06

## 2022-10-06 RX ORDER — INSULIN PMP CART,AUT,G6/7,CNTR
1 EACH SUBCUTANEOUS
Qty: 1 KIT | Refills: 0 | Status: SHIPPED | OUTPATIENT
Start: 2022-10-06

## 2022-10-06 RX ORDER — BLOOD-GLUCOSE,RECEIVER,CONT
1 EACH MISCELLANEOUS ONCE
Qty: 1 EACH | Refills: 0 | Status: SHIPPED | OUTPATIENT
Start: 2022-10-06 | End: 2022-10-06

## 2022-10-06 RX ORDER — INSULIN LISPRO 100 [IU]/ML
INJECTION, SOLUTION INTRAVENOUS; SUBCUTANEOUS
Qty: 3 ML | Refills: 0 | COMMUNITY
Start: 2022-10-06

## 2022-10-06 NOTE — PROGRESS NOTES
Joel Brendan  8/1/2000 was started on Dexcom G6 Continuous Glucose Sensor    10/6/2022    Referring Provider: Marion Kathleen Start time: 10:30 End time: 11:00    Pt being given 2 sample Dexcom sensors     Ref STP-SP-001  Lot [id number]   [facility]  Exp 12/31/22    And    Ref STP-SP-001  Lot [id number]  [id number]  Exp 7/21/23    Here with mom Bobo Cross    Pt was instructed to \"enter event\" for grams of carb and # of units of rapid-acting insulin but not to correct anymore. Comments: Pt is now streaming on Dexcom Clarity     Explained sensor to start recording in 2 hours        1. Test blood glucose if symptoms do not match sensor reading. 2. How to handle problems like MRI, CT scans, travel, etc.   3. Explained how to enter sensor code q 10 days and transmitter ID q 3 months   4. Showed pt how to change high/low alert as well as to see when sensor expires   5. Reviewed how to remove/separate transmitter from sensor in 10 days  6. Reviewed sensor expiration alerts 6 hours, 2 hours, 30 minutes and option to end sensor session early and why patient may want to end sensor before it expires   7. Instructed pt not to inject insulin within 3 inches of sensor   8. If patient using the Dexcom G6 mobile holland, instructed pt to keep the Dexcom G6 mobile holland open to avoid disconnection    9. Instructed pt they will have to return to QID fingerstick BG testing temporarily if out of sensors/reader/transmitter. 10. Explained it is water-proof   11. At airports, can go through metal detector but not full body scanner. 12. Pt provided with [facility] Customer Support phone # [phone]. Sensor Settings:  High Alarm: 300   Low Alarm: 80    Reviewed written material provided with product. Patient verbalized understanding and has no further questions at this time. Thank you for the referral.  Patient to follow-up with us once he receives Omnipod   Patient to contact diabetes center for questions/concerns.   Mary August MS, RD, [facility], IJEOMAN

## 2022-10-10 NOTE — TELEPHONE ENCOUNTER
Faxed chart note to [facility] @ [address] [place] [address] via [facility]

## 2022-10-11 NOTE — TELEPHONE ENCOUNTER
Called Aziza at [facility] to check the status of the [address] 327. She states she has contacted to get updated insurnace information - the current Medicaid is not showing active. Waiting for patient to return call.

## 2022-10-20 ENCOUNTER — TELEPHONE (OUTPATIENT)
Dept: ENDOCRINOLOGY CLINIC | Facility: CLINIC | Age: 22
End: 2022-10-20

## 2022-10-20 NOTE — TELEPHONE ENCOUNTER
CGM reviewed and there is some improvement in TIR and reduced variability. Has he been keeping a log of how much insulin he is taking and what he is eating so I can review?

## 2022-11-08 NOTE — TELEPHONE ENCOUNTER
Pt insurance was/is not active. He has not responded to our calls.   No follow up appt scheduled, myla

## 2022-12-07 ENCOUNTER — TELEPHONE (OUTPATIENT)
Dept: ENDOCRINOLOGY CLINIC | Facility: CLINIC | Age: 22
End: 2022-12-07

## 2022-12-07 DIAGNOSIS — E10.65 UNCONTROLLED TYPE 1 DIABETES MELLITUS WITH HYPERGLYCEMIA (HCC): ICD-10-CM

## 2022-12-07 RX ORDER — PEN NEEDLE, DIABETIC 30 GX3/16"
1 NEEDLE, DISPOSABLE MISCELLANEOUS 4 TIMES DAILY
Qty: 100 EACH | Refills: 0 | Status: SHIPPED | OUTPATIENT
Start: 2022-12-07

## 2022-12-07 RX ORDER — INSULIN LISPRO 100 [IU]/ML
INJECTION, SOLUTION INTRAVENOUS; SUBCUTANEOUS
Qty: 15 ML | Refills: 2 | Status: SHIPPED | OUTPATIENT
Start: 2022-12-07

## 2022-12-07 RX ORDER — INSULIN GLARGINE 100 [IU]/ML
15 INJECTION, SOLUTION SUBCUTANEOUS NIGHTLY
Qty: 15 ML | Refills: 0 | Status: SHIPPED | OUTPATIENT
Start: 2022-12-07

## 2022-12-07 RX ORDER — BLOOD-GLUCOSE SENSOR
1 EACH MISCELLANEOUS
Qty: 9 EACH | Refills: 3 | Status: SHIPPED | OUTPATIENT
Start: 2022-12-07

## 2022-12-07 NOTE — TELEPHONE ENCOUNTER
Mom called with concerns for son and his health and safety. Mom is not on release so I was unable to give her any informatiobn , only noted her concerns to share with provider. States he is not using the Dexcom, not testing BS. States son is not feeling well, always tired and about to start school. He never picked up the pump from [place] [facility]. Wants to know if we can call her phone to ask to speak with him to schedule an appointment. She feels once he gets started on the pump he will do edward but she is very concerned about poor diabetes control and long term effects. Mom would like us to call her phone to attempt to schedule a f/u appt with him and she will be able to hand him the phone if we are able to do that. She is not sure how else to help him but knows he needs to get on track. She is open to recommendations from provider as well.

## 2022-12-07 NOTE — TELEPHONE ENCOUNTER
Call returned to patients mom and spoke with patient. Discussed I am concerned for his diabetes control as he has been lost to follow up and did not start on insulin pump. Pt states work has been crazy and he knows he needs to do more for his diabetes. Pt agreed to schedule appt next week 12/13 at 1 pm. His mom states omnipod should be shipped to home by then. Asked to bring with him and will try and coordinate with CDE if possible for pump start. Pt in need of insulin and dexcom sensor refills until appt. Pt and mom verbalize understanding and gratitude.

## 2022-12-08 ENCOUNTER — TELEPHONE (OUTPATIENT)
Dept: ENDOCRINOLOGY CLINIC | Facility: CLINIC | Age: 22
End: 2022-12-08

## 2022-12-08 RX ORDER — PEN NEEDLE, DIABETIC 32GX 5/32"
4 NEEDLE, DISPOSABLE MISCELLANEOUS DAILY
Qty: 400 EACH | Refills: 5 | Status: SHIPPED | OUTPATIENT
Start: 2022-12-08 | End: 2023-12-08

## 2022-12-08 NOTE — TELEPHONE ENCOUNTER
Left message for pt that he should be receiving an email sent to his [employer] account (pt doesn't have Tricidat) with instructions on preparing for OP5 start 12/13/22 including watching the tutorials, connecting with [employer] and [employer], etc. Also sent him the 4-page Insulin Pump Start Information book.

## 2022-12-09 ENCOUNTER — TELEPHONE (OUTPATIENT)
Dept: ENDOCRINOLOGY CLINIC | Facility: CLINIC | Age: 22
End: 2022-12-09

## 2022-12-09 NOTE — TELEPHONE ENCOUNTER
Received fax from [place] that plan doesn't cover 30 G x 3/16 pen needles. We sent yesterday for 32G X 4 mm. Faxed info back to pharmacy to fill 32G x 4 mm.

## 2022-12-13 ENCOUNTER — DIABETIC EDUCATION (OUTPATIENT)
Dept: ENDOCRINOLOGY CLINIC | Facility: CLINIC | Age: 22
End: 2022-12-13
Payer: MEDICAID

## 2022-12-13 DIAGNOSIS — F32.2 CURRENT SEVERE EPISODE OF MAJOR DEPRESSIVE DISORDER WITHOUT PSYCHOTIC FEATURES, UNSPECIFIED WHETHER RECURRENT (HCC): ICD-10-CM

## 2022-12-13 DIAGNOSIS — E10.65 UNCONTROLLED TYPE 1 DIABETES MELLITUS WITH HYPERGLYCEMIA (HCC): Primary | ICD-10-CM

## 2022-12-13 NOTE — PROGRESS NOTES
Oz Seaman  8/1/2000 was started on Dexcom G6 Continuous Glucose Sensor    12/13/2022    Referring Provider: Nelly Mason Start time: 1:00 End time: 2:00    Here with mom Susanne Angeles. Pt was supposed to start OP5 today. He is supposed to be at work right now. Was 25 minutes late. They got lost. Is not in a current Dexcom sensor session. Pt spoke with diabetes provider during appt. He has 'given up'. Not testing BG's, not taking Basaglar. Just takes Humalog based on how he feels. He will be off work x 1 week after tonight, will see BHI and will return tomorrow to start OP5. Printed out and reviewed with pt:  1) pump contract  2) Insulin Pump Start Information  3) Design A message with instructions for tutorials and connecting [employer] and [employer] apps  4) Clinked    Comments: Pt is now streaming on [employer]    Explained sensor to start recording in 2 hours        1. Test blood glucose if symptoms do not match sensor reading. 2. How to handle problems like MRI, CT scans, travel, etc.   3. Explained how to enter sensor code q 10 days and transmitter ID q 3 months   4. Showed pt how to change high/low alert as well as to see when sensor expires   5. Reviewed how to remove/separate transmitter from sensor in 10 days  6. Reviewed sensor expiration alerts 6 hours, 2 hours, 30 minutes and option to end sensor session early and why patient may want to end sensor before it expires   7. Instructed pt not to inject insulin within 3 inches of sensor   8. If patient using the Dexcom G6 mobile holland, instructed pt to keep the Dexcom G6 mobile holland open to avoid disconnection    9. Instructed pt they will have to return to QID fingerstick BG testing temporarily if out of sensors/reader/transmitter. 10. Explained it is water-proof   11. At airports, can go through metal detector but not full body scanner. 12 Pt provided with [facility] Customer Support phone # [phone].     Sensor Settings:  High Alarm: 300 (off)   Low Alarm: 80    Reviewed written material provided with product. Patient verbalized understanding and has no further questions at this time. Thank you for the referral.  Patient to return tomorrow to start OP5, then will schedule f/u with educator and f/u with diabetes provider. Patient to contact diabetes center for questions/concerns.   Whitney Hernandez MS, RD, CDCES, LDN

## 2022-12-13 NOTE — PROGRESS NOTES
Spoke with patient and discussed his current foy with depression. Feels he has no motivation and sometimes no reason to live. Has had SI but denies any plans or recent attempts. Has had attempts in the past per pt. Has never received mental health medication or counseling. Pt is hesitant to add medication as he had family members where this increased depression and \"lead to their deaths. \" Pt is overwhelmed with work but also uses it to avoid dealing with his thoughts and loss of his father this year. He is willing to meet with a counselor but is often too busy to schedule. Pt given a note for 1 week off work starting today and spoke with Jennifer Simons from [facility] who is helping to coordinate a visit for patient. Due to insurance may not be able to continue seeing patient but can help facilitate transition to a provider in network.

## 2022-12-14 ENCOUNTER — DIABETIC EDUCATION (OUTPATIENT)
Dept: ENDOCRINOLOGY CLINIC | Facility: CLINIC | Age: 22
End: 2022-12-14
Payer: MEDICAID

## 2022-12-14 DIAGNOSIS — E10.65 UNCONTROLLED TYPE 1 DIABETES MELLITUS WITH HYPERGLYCEMIA (HCC): Primary | ICD-10-CM

## 2022-12-14 NOTE — PROGRESS NOTES
Illa Meter  8/1/2000 was seen for Insulin Pump Start Education:    12/14/2022  Referring Provider: Karina Paredespool Start time: 11:30 End time: 1:30    Here with thao Velazquez Dear. [employer]: he was not able to open an account, after a few challenges, he finally has a [employer] account. He had an [employer] account from when he had Cassandra Bull but he had never started on the Thomasene Bull. He is on Dexcom Clarity, average BG since yesterday is 338 mg/dL. He gave 15 units Humalog before the pump start and is at 177. He has worn a medtronic pump in the past as well. Assessment:     Reviewed utilization of insulin to carbohydrate ratio and correction factor   Reviewed difference between basal and bolus insulin   Discussed when to call the 1-800 number for pump problems, diabetes educator, or provider  Reviewed treatment of hypoglycemia and Rule of 15 including following up with protein after blood glucose increasing. Reviewed twice in doubt, take it out and take manual injection with pen/pen needle or vial/syringe. Pump Set-Up:  Discussed activity feature for when he is at work. Carb counting status: he counts his carbs. Last dose of basal: not taking any basal. Reinforced not to take it. Pt is connected to the [employer] portal.  Discussed giving a bolus once pump was loaded with insulin. Pt was reminded NOT to take basal insulin anymore unless off the pump for over 24 hours  Pt is using controller for Omnipod 5  Automated Mode started. Turned reverse correction to \"OFF\"  Demonstrated how to select \"use CGM\" when giving a bolus on Omnipod 5. Discussed \"line of sight\" for Omnipod/Dexcom G6 transmitter placement.     Insulin Pump Brand: [email or web address]    Infusion Sets: pods    Pump programmed with the following settings:    Basal: max basal 2  12A 0.05    Bolus: max bolus 20  ISF   12A 50    ICR  12A 15    Target 120-120    Duration of insulin action: 4    Pump Skills:   Discussed back-up plans for pump failure as well as infusion set failure    Plan:  Recommended follow up at 1 week for reviewing back-up plans for pump failure as well as infusion set failure and reviewing 'activity' feature. Reviewed written material provided with product. Patient verbalized understanding and has no further questions at this time. Patient to follow-up: diabetes educator Mon 12/19/22, then will schedule follow-up  with diabetes provider. Patient to contact diabetes center for questions/concerns.   Bertha Donaldson MS, RD, CDCES, LDN

## 2022-12-19 ENCOUNTER — DIABETIC EDUCATION (OUTPATIENT)
Dept: ENDOCRINOLOGY CLINIC | Facility: CLINIC | Age: 22
End: 2022-12-19
Payer: MEDICAID

## 2022-12-19 DIAGNOSIS — E10.65 UNCONTROLLED TYPE 1 DIABETES MELLITUS WITH HYPERGLYCEMIA (HCC): Primary | ICD-10-CM

## 2022-12-19 NOTE — PROGRESS NOTES
Diamond Jesús  8/1/2000 was seen for Continuous Glucose Sensor & Insulin Pump Follow-up Visit:    12/19/2022   Referring Provider: Lisa Smith   Start time: 1:30 End time: 2:30    Here with mom Sakina Lee    Pump/Sensor Type: OP5 (started 12/14/22)    Had trouble logging in to both [employer] (finally succeeded) and [facility] (got locked out, will have to try again next week at follow-up) so no [facility] report. Pt had a \"screaming pod\" Fri/Sat and 12/16-12/17 and returned to injections. Pt has glucagon emergency kit prescribed to him in August but he is not aware of it - may benefit from [address] if insulin covers. Encouraged pt to check when is appt with [facility] and to attend. Pt says it would be new to him to be vulnerable. Encouraged him to step out of his comfort zone - what does he have to lose? He agreed. Reviewed pump contract with pt, he signed it, will be sent to scan for view in media tab. Reviewed Pump with CGMS download:    Dates of download: 12/13-12/19/22  Average sensor reading : 319 mg/dl   Standard deviation: 97 mg/dl     74% time above 250 mg/dl(recommended <5%)  12% time above 180mg /dl (recommended <25%)  13% time in target range:  mg/dl (recommended 70%)  <1% time below target range: 70mg/dl (recommended <5%)  0% time below severe low less than 55 mg/dl (recommended <1%)    Pump settings:    Basal: Max basal: 2.0  12A 0.05 ----> 0.2    Bolus: Max bolus: 20    ISF  12A 50 ----->40    I:C  12A 15 ---->12    Target 120-120    Duration of insulin action: 4 ----->3    Plan: CGM download --copy sent to scan    Discussed pump failure back-up plan and infusion set/site issues. Reviewed use of advanced features (activity mode)  Patient verbalized understanding and has no further questions at this time.   Thank you for the referral.  Patient to follow up with diabetes educator 12/28 and scheduled with diabetes provider 1/4   Amidon James MS, RD, [address], IJEOMAN

## 2022-12-28 ENCOUNTER — TELEPHONE (OUTPATIENT)
Dept: ENDOCRINOLOGY CLINIC | Facility: CLINIC | Age: 22
End: 2022-12-28

## 2022-12-28 ENCOUNTER — DIABETIC EDUCATION (OUTPATIENT)
Dept: ENDOCRINOLOGY CLINIC | Facility: CLINIC | Age: 22
End: 2022-12-28
Payer: MEDICAID

## 2022-12-28 DIAGNOSIS — E10.65 UNCONTROLLED TYPE 1 DIABETES MELLITUS WITH HYPERGLYCEMIA (HCC): Primary | ICD-10-CM

## 2022-12-28 RX ORDER — GLUCAGON INJECTION, SOLUTION 0.5 MG/.1ML
0.5 INJECTION, SOLUTION SUBCUTANEOUS AS NEEDED
Qty: 0.2 ML | Refills: 0 | Status: SHIPPED | OUTPATIENT
Start: 2022-12-28

## 2022-12-28 NOTE — PROGRESS NOTES
Michelle Crocker  8/1/2000 was seen for Continuous Glucose Sensor & Insulin Pump Follow-up Visit:    12/28/2022   Referring Provider: Thor Purvis   Start time: 5:00 End time: 5:30    Pt arrived 30 minutes late, has to rely on others for transportation. OP5 f/u (started 12/14/22)    Still unable to log into [facility] and therefore [facility] so no reports but we do have Dexcom report. Reviewed history details in OP5 controller:  No bolus today so far  Was off pump yesterday  TDD 12/25 = 47 units  TDD 12/24 = 49 units  TDD 12/23 = 43 units  So 500 divided by TDD = 10 = ICR  1800 divided by TDD = 36 = ISF    Disconnects from the pump to give the site a break. Needs a break mentally. Has not had a chance to see [facility] - he thinks he has an upcoming appt. Per : we are not in contract with his ins. .. Devon Sorto he was informed of this last visit. ..and dr Roby Landrum was going to refer him out to an outside clinic who takes his ins. He has meridian now and will have [address] in Feb.    Says the site burns and itches after a day or two - will give him tegaderm samples. We are trying again to log into his [facility] - he finally got into that. He is locked out of [facility] after multiple failed attempts.     Pump/Sensor Type: OP5    Reviewed with CGMS download:  Dexcom:  Dates of download: 12/22-12/28/22  Average sensor reading : 262 mg/dl   Standard deviation: 109 mg/dl   GMI: n/a%    52% time above 250 mg/dl(recommended <5%)  22% time above 180mg /dl (recommended <25%)  25% time in target range:  mg/dl (recommended 70%)  <1% time below target range: 70mg/dl (recommended <5%)  <1% time below severe low less than 55 mg/dl (recommended <1%)    Insulin Pump Settings:    Type of insulin: Humalog    TDD: 50 (? % basal)    Pump settings:    Basal: Max basal: 2.0  12A 0.2 ----> 0.3 per pt request.    Bolus: Max bolus:     ISF  12A 40    ICR  12A 12    Target 120-120    Duration of insulin action: 3    Recommended self-management changes: try tegaderm and try to stay on the pump!!!!! He states he is trying. Plan: CGM download --copy sent to scan    Patient verbalized understanding and has no further questions at this time. Thank you for the referral.  Patient to follow up with diabetes provider Wed Jan 4th at 4pm - reminded pt.    Kylee Harmon MS, RD, CDCES, LDN

## 2023-01-04 ENCOUNTER — TELEPHONE (OUTPATIENT)
Dept: ENDOCRINOLOGY CLINIC | Facility: CLINIC | Age: 23
End: 2023-01-04

## 2023-05-04 ENCOUNTER — TELEPHONE (OUTPATIENT)
Dept: ENDOCRINOLOGY CLINIC | Facility: CLINIC | Age: 23
End: 2023-05-04

## 2023-05-04 NOTE — TELEPHONE ENCOUNTER
Spoke to mother. Pt is taking OTC 5000 IUs daily. I told her if he's already taking, no reason to stop before he sees us on the 24th. Pt has NOT had vit D repeated in very long time. She said pt has been having problems sleeping, so she re-started him on Vit D. Mother said pt does not see PCP and she doesn't think he will. She said he does not like drs and is only coming to see KR because he realizes he cannot continue to get Rxs without ever being seen. Told her I will let KR know and KR can discuss further with pt if labs should be ordered.

## 2023-05-04 NOTE — TELEPHONE ENCOUNTER
: went to add note to office visit for provider and got a pop-up message saying pt is out of network. Please look into.

## 2023-05-04 NOTE — TELEPHONE ENCOUNTER
Mother called . Made apt . Labs were done patient is low on Vitamin D. Mother bought in store . Does patientn need higher dose?

## 2023-05-04 NOTE — TELEPHONE ENCOUNTER
Malaika Yanes, we have no recent labs. I do not know what pt is taking. Would you like to order vit D prior to upcoming OV? Nothing in care everywhere either.  Last Vit D in chart was 3/27/21  Future Appointments   Date Time Provider Antonella Sanchez   5/24/2023  4:00 PM Ebony Jones, [facility] [address]

## 2023-05-24 ENCOUNTER — OFFICE VISIT (OUTPATIENT)
Dept: ENDOCRINOLOGY CLINIC | Facility: CLINIC | Age: 23
End: 2023-05-24
Payer: MEDICAID

## 2023-05-24 VITALS — WEIGHT: 137 LBS | BODY MASS INDEX: 20 KG/M2

## 2023-05-24 DIAGNOSIS — E10.65 UNCONTROLLED TYPE 1 DIABETES MELLITUS WITH HYPERGLYCEMIA (HCC): Primary | ICD-10-CM

## 2023-05-24 DIAGNOSIS — F41.9 ANXIETY: ICD-10-CM

## 2023-05-24 LAB
CARTRIDGE LOT#: 587 NUMERIC
HEMOGLOBIN A1C: 13.7 % (ref 4.3–5.6)

## 2023-05-24 PROCEDURE — 83036 HEMOGLOBIN GLYCOSYLATED A1C: CPT | Performed by: NURSE PRACTITIONER

## 2023-05-24 PROCEDURE — 99215 OFFICE O/P EST HI 40 MIN: CPT | Performed by: NURSE PRACTITIONER

## 2023-05-24 RX ORDER — INSULIN LISPRO 100 [IU]/ML
100 INJECTION, SOLUTION INTRAVENOUS; SUBCUTANEOUS DAILY
Qty: 30 ML | Refills: 2 | Status: SHIPPED | OUTPATIENT
Start: 2023-05-24

## 2023-05-24 RX ORDER — PROCHLORPERAZINE 25 MG/1
1 SUPPOSITORY RECTAL
Qty: 1 EACH | Refills: 3 | Status: SHIPPED | OUTPATIENT
Start: 2023-05-24

## 2023-05-24 RX ORDER — PROCHLORPERAZINE 25 MG/1
1 SUPPOSITORY RECTAL
Qty: 9 EACH | Refills: 3 | Status: SHIPPED | OUTPATIENT
Start: 2023-05-24

## 2023-05-24 RX ORDER — INSULIN PMP CART,AUT,G6/7,CNTR
1 EACH SUBCUTANEOUS
Qty: 10 EACH | Refills: 5 | Status: SHIPPED | OUTPATIENT
Start: 2023-05-24

## 2023-05-26 ENCOUNTER — TELEPHONE (OUTPATIENT)
Dept: ENDOCRINOLOGY | Facility: HOSPITAL | Age: 23
End: 2023-05-26

## 2023-05-31 ENCOUNTER — TELEPHONE (OUTPATIENT)
Dept: ENDOCRINOLOGY CLINIC | Facility: CLINIC | Age: 23
End: 2023-05-31

## 2023-05-31 NOTE — TELEPHONE ENCOUNTER
Received PA for OP 5. This can not be completed until dexcom supplies are approved. Please complete PA at that time through [employer].

## 2023-06-01 ENCOUNTER — TELEPHONE (OUTPATIENT)
Dept: ENDOCRINOLOGY CLINIC | Facility: CLINIC | Age: 23
End: 2023-06-01

## 2023-06-01 NOTE — TELEPHONE ENCOUNTER
PA denied states \" you must have received diabetic training or you must take a program that will educate you on diabetes. This will teach how to use CGM. \"     Sending to scan please advice.

## 2023-06-01 NOTE — TELEPHONE ENCOUNTER
Can you please call pt and ask if he resumed dexcom and omnipod 5 - pt was given a sample dexcom and had pods left at home.  He is not streaming on dexcom if he has resumed will also need to get streaming

## 2023-06-06 NOTE — TELEPHONE ENCOUNTER
PSR attempted to call pt again today to confirm virtual appt for tomorrow and unable to reach. Pt's mother does not think he is on pump or dexcom as insurance denied it however pt told me he had supplies at home and was given dexcom sample at last visit. Will discuss at phone visit tomorrow.

## 2023-06-07 ENCOUNTER — TELEPHONE (OUTPATIENT)
Dept: ENDOCRINOLOGY CLINIC | Facility: CLINIC | Age: 23
End: 2023-06-07

## 2023-06-07 ENCOUNTER — MOBILE ENCOUNTER (OUTPATIENT)
Dept: ENDOCRINOLOGY CLINIC | Facility: CLINIC | Age: 23
End: 2023-06-07

## 2023-06-07 ENCOUNTER — VIRTUAL PHONE E/M (OUTPATIENT)
Dept: ENDOCRINOLOGY CLINIC | Facility: CLINIC | Age: 23
End: 2023-06-07
Payer: MEDICAID

## 2023-06-07 DIAGNOSIS — E10.65 UNCONTROLLED TYPE 1 DIABETES MELLITUS WITH HYPERGLYCEMIA (HCC): ICD-10-CM

## 2023-06-07 DIAGNOSIS — E10.65 UNCONTROLLED TYPE 1 DIABETES MELLITUS WITH HYPERGLYCEMIA (HCC): Primary | ICD-10-CM

## 2023-06-07 PROCEDURE — 99215 OFFICE O/P EST HI 40 MIN: CPT | Performed by: NURSE PRACTITIONER

## 2023-06-07 RX ORDER — INSULIN GLARGINE 100 [IU]/ML
10 INJECTION, SOLUTION SUBCUTANEOUS NIGHTLY
Qty: 15 ML | Refills: 0 | Status: SHIPPED | OUTPATIENT
Start: 2023-06-07 | End: 2023-06-07

## 2023-06-07 RX ORDER — INSULIN GLARGINE 100 [IU]/ML
10 INJECTION, SOLUTION SUBCUTANEOUS NIGHTLY
Qty: 15 ML | Refills: 0 | Status: SHIPPED | OUTPATIENT
Start: 2023-06-07

## 2023-06-07 RX ORDER — INSULIN LISPRO 100 [IU]/ML
100 INJECTION, SOLUTION INTRAVENOUS; SUBCUTANEOUS DAILY
Qty: 30 ML | Refills: 2 | Status: SHIPPED | OUTPATIENT
Start: 2023-06-07 | End: 2023-06-07

## 2023-06-07 RX ORDER — INSULIN LISPRO 100 [IU]/ML
150 INJECTION, SOLUTION INTRAVENOUS; SUBCUTANEOUS DAILY
Qty: 30 ML | Refills: 2 | Status: SHIPPED | OUTPATIENT
Start: 2023-06-07

## 2023-06-07 NOTE — PROGRESS NOTES
Patient's mom paged on-call service again stating pharmacy would not fill Humalog without a dose change. Spoke with pharmacy and instructed order will be changed to read 100 to 150 units daily insulin lispro via insulin pump. Pharmacist verbalizes understanding and Rx sent to.

## 2023-06-07 NOTE — TELEPHONE ENCOUNTER
Patient's mom Robert Pires paged on call service at [phone] regarding message I left her. Patients mother is aware pt was advised to go to ER and he is still refusing, stating he will go if sx worsen. Mother is on her way to fill rx for Lantus and more Humalog as well as ketone strips. Mother will again try and convince him to go to ER.

## 2023-06-08 ENCOUNTER — TELEPHONE (OUTPATIENT)
Dept: ENDOCRINOLOGY CLINIC | Facility: CLINIC | Age: 23
End: 2023-06-08

## 2023-06-08 DIAGNOSIS — E10.65 UNCONTROLLED TYPE 1 DIABETES MELLITUS WITH HYPERGLYCEMIA (HCC): Primary | ICD-10-CM

## 2023-06-08 RX ORDER — BLOOD SUGAR DIAGNOSTIC
STRIP MISCELLANEOUS
Qty: 100 STRIP | Refills: 3 | Status: SHIPPED | OUTPATIENT
Start: 2023-06-08 | End: 2024-06-07

## 2023-06-08 RX ORDER — BLOOD-GLUCOSE METER
1 KIT MISCELLANEOUS AS DIRECTED
Qty: 1 KIT | Refills: 0 | Status: SHIPPED | OUTPATIENT
Start: 2023-06-08

## 2023-06-08 RX ORDER — LANCETS 30 GAUGE
1 EACH MISCELLANEOUS 3 TIMES DAILY
Qty: 100 EACH | Refills: 3 | Status: SHIPPED | OUTPATIENT
Start: 2023-06-08

## 2023-06-08 NOTE — TELEPHONE ENCOUNTER
Attempted to contact pt.; went to  . Then contacted pt's mother. States he hasn't put the Dexcom on;he has been sleeping for the past few hrs. Asked her if he had a meter & supplies to check blood sugar. John Acosta he has an older one. Offered to send a prescription to the pharmacy for meter & supplies. She was agreeable. She said he has been drinking plenty of water & Gatorade. Told him he couldn't drink regular gingerale (too much sugar). She said he has Lantus & Humalog  Insulin. Also checked his urine for ketones;showed moderate so forced him to drink lots of water. Blood suagr running between 200-300 mg/dL She has had a difficult time trying to have him get back on the Dexcom . Encouraged her to see if he would because then we would be able to see BG trends. Encouraged to call office number ; an APN will be taking call. Will contact them in the am to check on his condition.  She v/u & was agreeable w/plan

## 2023-06-09 RX ORDER — BLOOD SUGAR DIAGNOSTIC
STRIP MISCELLANEOUS
Qty: 100 STRIP | Refills: 5 | Status: SHIPPED | OUTPATIENT
Start: 2023-06-09 | End: 2024-06-08

## 2023-06-09 NOTE — TELEPHONE ENCOUNTER
GISEL to return call to provide blood sugar readings. Contacted pt.'s mother , Susanne Angeles. States he is positive for Covid, she also has the same symptoms. She hasn't spoken to him yet this am. She will find out what his readings are & will call back. He also received a letter from his insurance that Dexcom isn't covered. Informed her we do have a sample kit containing 1 sensor & 1 transmitter if she could possibly come to the office to pick. I told her I will find out next steps to get system approved. She w/u & was agreeable w/plan.

## 2023-06-09 NOTE — TELEPHONE ENCOUNTER
Pt mother called and said to let Kanu Patel know her son's numbers since yesterday have been in the upper 300s. Told her I would let Kanu Patel know and we will call her back.

## 2023-06-09 NOTE — TELEPHONE ENCOUNTER
Contacted pt.'s mother & informed her that he needs to become more compliant with hsi treatment regimen or may need to be referred to another office. APN wants pt. To schedule an appt. With a CDCES in the office to review  Insulin dosing & Dexcom . May be difficult to have in office meeting due to positive Covid test.Mother states that he took Lantus 5 units at 3pm. Readings are still 300-400's but has been drinking regular Gingerale. Suggested water or Gatorade Zero & needs to continue checking ketones for BG levels greater than 250 mg/dL. Also informed her that a letter was mailed from our office which needs to be completed as part of the P.A. process for Medicaid. Said she will look for it in the mail. She v/u & was agreeable w/plan.

## 2023-06-09 NOTE — TELEPHONE ENCOUNTER
If pt is continuously non-compliant with tx regimen may need to be referred to another office. PSR staff- Please schedule a CDE visit with pt to review insulins further and advise pt to wear his dexcom CGM to visit and minimum of 4 days prior. Pt to schedule with [facility] at earliest available and in the meantime see me back in 1 month. If he is able and willing to resume insulin pump before then can see me sooner - within 1 week of pump restart. If I am unavailable please schedule with CDE.

## 2023-06-09 NOTE — TELEPHONE ENCOUNTER
Returned mother's call. I asked for a list of blood sugar readings. She says he hasn't given her individual readings just 300's all night & today. Mother found out he hasn't started the Lantus . She thinks it's  because he is afraid of a low. Explained to her how the 2 insulins work in different ways;needs to start even if it 5 units to bring his fasting levels down. Has been taking 20 units of Humalog before meals. Informed her we will start working on Dexcom order & if she could try to look at pt.'s meter to provide us actual BG readings. We aren't able to adjust insulin without them. She v/u & was agreeable w/plan.

## 2023-06-13 ENCOUNTER — TELEPHONE (OUTPATIENT)
Dept: ENDOCRINOLOGY CLINIC | Facility: CLINIC | Age: 23
End: 2023-06-13

## 2023-06-13 NOTE — TELEPHONE ENCOUNTER
Please schedule with CDE after 10 days from Covid onset to review insulin. If pt is willing to resume dexcom and omnipod at that time as well.

## 2023-06-13 NOTE — TELEPHONE ENCOUNTER
Dexcom supplies were denied and we are trying to appeal.  Authorized representative designation form was sent to patient to sign and send back, needs to be sent with appeal.

## 2023-06-20 NOTE — TELEPHONE ENCOUNTER
Appeal letter with chart note and signed authorized representative designation form was faxed and confirmed on 06/16/2023 @ 4 pm to [phone]

## 2023-07-12 NOTE — TELEPHONE ENCOUNTER
Letter received in mail from [employer]. They have reversed their denial on dexcom G6, contacted pharmacy and they are going through. Will need to order, don't have in stock. Left message for patient.

## 2023-07-20 ENCOUNTER — OFFICE VISIT (OUTPATIENT)
Dept: ENDOCRINOLOGY CLINIC | Facility: CLINIC | Age: 23
End: 2023-07-20
Payer: MEDICAID

## 2023-07-20 VITALS
SYSTOLIC BLOOD PRESSURE: 139 MMHG | OXYGEN SATURATION: 98 % | WEIGHT: 131.38 LBS | BODY MASS INDEX: 19 KG/M2 | RESPIRATION RATE: 20 BRPM | HEART RATE: 95 BPM | DIASTOLIC BLOOD PRESSURE: 87 MMHG

## 2023-07-20 DIAGNOSIS — J01.90 ACUTE NON-RECURRENT SINUSITIS, UNSPECIFIED LOCATION: ICD-10-CM

## 2023-07-20 DIAGNOSIS — E10.65 UNCONTROLLED TYPE 1 DIABETES MELLITUS WITH HYPERGLYCEMIA (HCC): Primary | ICD-10-CM

## 2023-07-20 DIAGNOSIS — R06.2 WHEEZING: ICD-10-CM

## 2023-07-20 LAB
CREAT UR-SCNC: 118 MG/DL
MICROALBUMIN UR-MCNC: 0.73 MG/DL
MICROALBUMIN/CREAT 24H UR-RTO: 6.2 UG/MG (ref ?–30)

## 2023-07-20 PROCEDURE — 99214 OFFICE O/P EST MOD 30 MIN: CPT | Performed by: NURSE PRACTITIONER

## 2023-07-20 PROCEDURE — 82043 UR ALBUMIN QUANTITATIVE: CPT | Performed by: NURSE PRACTITIONER

## 2023-07-20 PROCEDURE — 3075F SYST BP GE 130 - 139MM HG: CPT | Performed by: NURSE PRACTITIONER

## 2023-07-20 PROCEDURE — 3079F DIAST BP 80-89 MM HG: CPT | Performed by: NURSE PRACTITIONER

## 2023-07-20 PROCEDURE — 95251 CONT GLUC MNTR ANALYSIS I&R: CPT | Performed by: NURSE PRACTITIONER

## 2023-07-20 PROCEDURE — 82570 ASSAY OF URINE CREATININE: CPT | Performed by: NURSE PRACTITIONER

## 2023-07-20 RX ORDER — AZITHROMYCIN 250 MG/1
TABLET, FILM COATED ORAL
Qty: 6 TABLET | Refills: 0 | Status: SHIPPED | OUTPATIENT
Start: 2023-07-20 | End: 2023-07-24

## 2023-07-20 RX ORDER — ALBUTEROL SULFATE 90 UG/1
2 AEROSOL, METERED RESPIRATORY (INHALATION) EVERY 4 HOURS PRN
Qty: 1 EACH | Refills: 0 | Status: SHIPPED | OUTPATIENT
Start: 2023-07-20

## 2023-07-21 ENCOUNTER — TELEPHONE (OUTPATIENT)
Dept: ENDOCRINOLOGY | Facility: HOSPITAL | Age: 23
End: 2023-07-21

## 2023-07-21 NOTE — TELEPHONE ENCOUNTER
Navigator tried to contact patient, Pratibhagokul Bright to return call [phone].
normal rate and rhythm, no chest pain and no edema.

## 2023-08-24 ENCOUNTER — OFFICE VISIT (OUTPATIENT)
Dept: ENDOCRINOLOGY CLINIC | Facility: CLINIC | Age: 23
End: 2023-08-24
Payer: MEDICAID

## 2023-08-24 VITALS
RESPIRATION RATE: 16 BRPM | DIASTOLIC BLOOD PRESSURE: 62 MMHG | WEIGHT: 138.63 LBS | OXYGEN SATURATION: 99 % | BODY MASS INDEX: 20 KG/M2 | SYSTOLIC BLOOD PRESSURE: 124 MMHG | HEART RATE: 102 BPM

## 2023-08-24 DIAGNOSIS — E10.65 UNCONTROLLED TYPE 1 DIABETES MELLITUS WITH HYPERGLYCEMIA (HCC): Primary | ICD-10-CM

## 2023-08-24 LAB
CARTRIDGE LOT#: 603 NUMERIC
HEMOGLOBIN A1C: 14 % (ref 4.3–5.6)
TEST STRIP LOT #: NORMAL NUMERIC

## 2023-08-24 PROCEDURE — 3074F SYST BP LT 130 MM HG: CPT | Performed by: NURSE PRACTITIONER

## 2023-08-24 PROCEDURE — 99214 OFFICE O/P EST MOD 30 MIN: CPT | Performed by: NURSE PRACTITIONER

## 2023-08-24 PROCEDURE — 83036 HEMOGLOBIN GLYCOSYLATED A1C: CPT | Performed by: NURSE PRACTITIONER

## 2023-08-24 PROCEDURE — 3078F DIAST BP <80 MM HG: CPT | Performed by: NURSE PRACTITIONER

## 2023-08-24 PROCEDURE — 82947 ASSAY GLUCOSE BLOOD QUANT: CPT | Performed by: NURSE PRACTITIONER

## 2023-10-03 ENCOUNTER — OFFICE VISIT (OUTPATIENT)
Dept: ENDOCRINOLOGY CLINIC | Facility: CLINIC | Age: 23
End: 2023-10-03
Payer: MEDICAID

## 2023-10-03 VITALS
WEIGHT: 136 LBS | RESPIRATION RATE: 20 BRPM | DIASTOLIC BLOOD PRESSURE: 62 MMHG | BODY MASS INDEX: 20 KG/M2 | HEART RATE: 83 BPM | OXYGEN SATURATION: 98 % | SYSTOLIC BLOOD PRESSURE: 102 MMHG

## 2023-10-03 DIAGNOSIS — E10.65 TYPE 1 DIABETES MELLITUS WITH HYPERGLYCEMIA (HCC): Primary | ICD-10-CM

## 2023-10-03 PROBLEM — M62.82 RHABDOMYOLYSIS: Status: RESOLVED | Noted: 2019-06-06 | Resolved: 2023-10-03

## 2023-10-03 PROBLEM — E87.1 HYPONATREMIA: Status: RESOLVED | Noted: 2022-08-22 | Resolved: 2023-10-03

## 2023-10-03 PROBLEM — M62.838 MUSCLE SPASMS OF BOTH LOWER EXTREMITIES: Status: RESOLVED | Noted: 2019-06-06 | Resolved: 2023-10-03

## 2023-10-03 PROBLEM — E87.20 METABOLIC ACIDOSIS: Status: RESOLVED | Noted: 2022-08-22 | Resolved: 2023-10-03

## 2023-10-03 PROBLEM — R73.9 HYPERGLYCEMIA: Status: RESOLVED | Noted: 2022-08-22 | Resolved: 2023-10-03

## 2023-10-03 PROBLEM — E87.6 HYPOKALEMIA: Status: RESOLVED | Noted: 2022-08-22 | Resolved: 2023-10-03

## 2023-10-03 PROCEDURE — 99215 OFFICE O/P EST HI 40 MIN: CPT | Performed by: NURSE PRACTITIONER

## 2023-10-03 PROCEDURE — 3074F SYST BP LT 130 MM HG: CPT | Performed by: NURSE PRACTITIONER

## 2023-10-03 PROCEDURE — 3078F DIAST BP <80 MM HG: CPT | Performed by: NURSE PRACTITIONER

## 2023-10-03 RX ORDER — LANCETS 30 GAUGE
1 EACH MISCELLANEOUS 4 TIMES DAILY
Qty: 200 EACH | Refills: 11 | Status: SHIPPED | OUTPATIENT
Start: 2023-10-03

## 2023-10-03 RX ORDER — INSULIN PMP CART,AUT,G6/7,CNTR
1 EACH SUBCUTANEOUS
Qty: 10 EACH | Refills: 5 | Status: SHIPPED | OUTPATIENT
Start: 2023-10-03

## 2023-10-03 RX ORDER — PROCHLORPERAZINE 25 MG/1
1 SUPPOSITORY RECTAL
Qty: 1 EACH | Refills: 3 | Status: SHIPPED | OUTPATIENT
Start: 2023-10-03

## 2023-10-03 RX ORDER — BLOOD SUGAR DIAGNOSTIC
1 STRIP MISCELLANEOUS 4 TIMES DAILY
Qty: 200 STRIP | Refills: 11 | Status: SHIPPED | OUTPATIENT
Start: 2023-10-03

## 2023-10-03 RX ORDER — INSULIN GLARGINE 100 [IU]/ML
INJECTION, SOLUTION SUBCUTANEOUS
Qty: 15 ML | Refills: 0 | Status: SHIPPED | OUTPATIENT
Start: 2023-10-03

## 2023-10-03 RX ORDER — GLUCAGON INJECTION, SOLUTION 1 MG/.2ML
1 INJECTION, SOLUTION SUBCUTANEOUS ONCE AS NEEDED
Qty: 0.2 ML | Refills: 0 | Status: SHIPPED | OUTPATIENT
Start: 2023-10-03 | End: 2023-10-03

## 2023-10-03 RX ORDER — PROCHLORPERAZINE 25 MG/1
1 SUPPOSITORY RECTAL
Qty: 9 EACH | Refills: 3 | Status: SHIPPED | OUTPATIENT
Start: 2023-10-03

## 2023-10-03 NOTE — PATIENT INSTRUCTIONS
We are here to support you with Diabetes but please remember that you still need your primary care doctor for your routine health maintenance. Your current A1C: 14%  This test provides us with your average blood sugar for the past 3 months. The main goal of diabetes treatment is to keep your sugar from going too high. We measure your overall blood sugar trends with a Hemoglobin A1C test. (also called an A1C)  For most people the target is less than 7.0% but sometimes we make exceptions based on age, health history and other factors. Keeping an A1C less than 7% helps prevents diabetes related health problems. If your A1C goes too high, then we need to talk about changing your current diabetes treatment. MEDICATIONS:   It is important to take all of your medications as prescribed. Please call me if you cannot get the prescriptions filled or are having issues with refills. Also, please call me if you have any issues with medication questions, side effects, dosing questions or problems with your blood sugar trends BEFORE CHANGING OR STOPPING ANY MEDICATIONS. Restart Lantus at 7 units injection daily  Change Humalog dosing to ICR 1u:20g and ISF 1u:50 mg/dl for target 120 mg/dl  Gvoke for treatment of severe hypoglycemia    Blood sugar testing:   Always bring your glucose meter or blood sugar logbook to every appointment here at the diabetes center. This allows me to safely make adjustments to your diabetes plan. In order for me to determine any patterns in your blood sugars, you will need to continue to use Dexcom CGM or test your blood sugar 4 times daily   Call if 2 readings below 80 mg/dl in 1 week for medication adjustment. Blood sugar targets:  Before breakfast:   (preferably < 110)  2 hours After meals: less than 180 (preferably less than 150)   Call for persistent blood sugars < 75 or > 200.    Blood sugars greater than 200 are not acceptable to reach your goal of improving diabetes Health Maintenance:   1. LABS: It is important to monitor your kidney function (blood and urine protein levels) , liver function tests and cholesterol levels when you have diabetes. 2. FOOT EXAMS:  daily foot inspections for foot wounds or skin changes are important for foot care. Any unusual changes should be reported immediately. 3. EYE EXAMS: Checking your eyes for diabetes changes is important and you should have a dilated eye exam done by an eye doctor EVERY year since these changes occur in the BACK of the eye and not visible by you. Please let me know if you need provider list for eye doctor. Lifestyle Therapy:    1. NUTRITION: Maintain optimal weight, calorie restriction if overweight, plant-based diet    2. PHYSICAL ACTIVITY: 150 minutes per week (30 minutes a day 5 days a week) of moderate exertion such as walking, stair climbing. Include strength training 2-3 times per week. Increase as tolerated. 3. SLEEP: Try and get 7-8 hours of sleep per night    4. BEHAVIOR:  Tobacco cessation and alcohol in moderation      Reminders: Follow up with [facility] to resume OmniPod 5 therapy  Have labs done  American Diabetes Association - diabetes. org  Schedule with Dr Anastasiya Herrera, BC-[facility], [facility]  [address], [address], [address]  [address] [place] [address], [address] [place], [address]  [address]  Madonna Sesay, [address] [place] [address]  Diabetes Services at [facility] [address]

## 2023-10-04 ENCOUNTER — TELEPHONE (OUTPATIENT)
Dept: ENDOCRINOLOGY CLINIC | Facility: CLINIC | Age: 23
End: 2023-10-04

## 2023-10-04 NOTE — TELEPHONE ENCOUNTER
Received fax that OP5 pods are requiring a PA  Key [id number]  Submitted via covermymeds    Your information has been submitted to [employer]. [employer] is reviewing the PA request and you will receive an electronic response. You may check for the updated outcome later by reopening this request. The standard fax determination will also be sent to you directly. If you have any questions about your PA submission, contact [employer] at [phone].

## 2023-10-05 NOTE — TELEPHONE ENCOUNTER
Received fax from [address] for OP5 pods has been denied. Reason: prescriber must provide 1c before starting the requested pump gives info on appeal. Sent letter to scan.  Please advise

## 2023-10-05 NOTE — TELEPHONE ENCOUNTER
Appeal letter written for OmniPod 5, please send with last office visit note and recent A1c from August.    [facility] APRN, BC-ADM, CDCES

## 2023-10-05 NOTE — TELEPHONE ENCOUNTER
Printed/faxed appeal letter with last OV notes and A1c from 8/24 to [facility] appeals at [phone]

## 2023-10-11 ENCOUNTER — TELEPHONE (OUTPATIENT)
Dept: ENDOCRINOLOGY CLINIC | Facility: CLINIC | Age: 23
End: 2023-10-11

## 2023-10-11 NOTE — TELEPHONE ENCOUNTER
Left pt message to check in on whether he received his Omnipod 5 supplies and if he still has an active [employer] account. Also checking to see if he can potentially switch appt time from 10/17 to 10/16 as Omnipod Rep will be in office on 10/16 for another training.

## 2023-10-12 NOTE — TELEPHONE ENCOUNTER
Received letter from [facility] that appeal was received 10/5/23 and a decision will be reached before 10/27/23

## 2023-10-16 NOTE — TELEPHONE ENCOUNTER
Called [employer] at [phone] and spent 20 mins going back and forth to find the right line for appeals with no luck getting through to the correct person

## 2023-10-17 ENCOUNTER — TELEPHONE (OUTPATIENT)
Dept: ENDOCRINOLOGY CLINIC | Facility: CLINIC | Age: 23
End: 2023-10-17

## 2023-10-17 ENCOUNTER — DIABETIC EDUCATION (OUTPATIENT)
Dept: ENDOCRINOLOGY CLINIC | Facility: CLINIC | Age: 23
End: 2023-10-17
Payer: MEDICAID

## 2023-10-17 DIAGNOSIS — E10.65 TYPE 1 DIABETES MELLITUS WITH HYPERGLYCEMIA (HCC): Primary | ICD-10-CM

## 2023-10-17 PROCEDURE — G0108 DIAB MANAGE TRN  PER INDIV: HCPCS

## 2023-10-17 NOTE — PROGRESS NOTES
Miguel Stewart  : 2000 was seen for Individual Insulin Pump Follow up:    Date: 10/17/2023   Start time:  1pm  End time: 1:30 pm      Pt came in today to set up his Omnipod 5 pump. He is waiting for appeals result from [employer] for coverage, decision will be reached before 10/27/23. Pt has 7 pods left from previous order, but does not have controller (thinks he may have lost it). He has also never used the holland on his phone in the past. Pt currently has less than a quarter of a vile of Lispro left (APN notified to reorder). When asked why he stopped using the pump system, he said he would have to stop using it when he ran out of insulin and would not always start it back up again. Reviewed carb:insulin dosing and pt was able to correctly count carb examples given. Dexcom G6 picked up from the pharmacy, pt started a new sensor today. His current cell phone is not compatible with the Omnipod 5 holland. Medications:   Humalog ICR 1u:15g and ISF 1u:  for target of 130 mg/dl (states he usually uses 15u at a meal time w/correction)   Lantus - sometimes take 7u daily, afraid of hypoglycemia, Gvoke for treatment of severe hypoglycemia   Lispro less than 1/4 of a vile left, Has 3 pens of Lantus at home      Reviewed pump settings:   Basal rate(s): 0.3 u/hr   I:C 20g/1u       CF: 50 mg/dL/u   Target goals: Daytime 120 mg/dL   Active insulin time: 3 hrs      Evaluated CGM download:  type:Dexcom G6  Days of use: -10/13/23  Average glucose (mg/dL): 261  Standard deviation: 112  Glucose Management indicator (GMI): 9.6    Target Ranges:  mg/dL      -   Very High 53%  -   High 17%  -   In Range 27%  -   Low 2%  -   Very Low 1%    Pattern of hypoglycemia -  pt has low blood sugar of 41 mg/dL. He did not remember if he took insulin before this event.  We reviewed treatment for hypoglycemia and discussed decreasing   Pattern of hyperglycemia  CGM report reviewed/printed and submitted to be scanned into chart.      RECOMMENDATIONS:  Pt to come back in for pump startup once he is able to refill his insulin and receives approval form [employer]. Reviewed pump back up plan. Patient verbalized understanding and has no further questions at this time.       Felicitas Garcia RDN, LDN, [facility]  Certified Diabetes Care and   Registered Dietitian

## 2023-10-17 NOTE — TELEPHONE ENCOUNTER
[employer] @ [phone] (press 1 for member to check status on appeal I was told for future reference), spoke with Monica Felder. No decision has been made, spoke with representative who attempted to expedite decision. We then contacted [employer], spoke to [place] for denial reason. Per [employer], they needed A1C prior to starting pump, states they did not receive chart notes on covermymeds. Will complete additional PA (having current issues with website).   Laith 48 call [facility]#[phone]

## 2023-10-18 NOTE — TELEPHONE ENCOUNTER
Renewed PA on OP5 and completed again as urgent, chart note attached. Jenkins: Blessing Anrdt)    Your information has been submitted to [email or web address]. [employer] is reviewing the PA request and you will receive an electronic response. You may check for the updated outcome later by reopening this request. The standard fax determination will also be sent to you directly. If you have any questions about your PA submission, contact [email or web address] at [phone].

## 2023-10-19 ENCOUNTER — VIRTUAL PHONE E/M (OUTPATIENT)
Dept: ENDOCRINOLOGY CLINIC | Facility: CLINIC | Age: 23
End: 2023-10-19
Payer: MEDICAID

## 2023-10-19 DIAGNOSIS — E10.65 TYPE 1 DIABETES MELLITUS WITH HYPERGLYCEMIA (HCC): Primary | ICD-10-CM

## 2023-10-19 NOTE — TELEPHONE ENCOUNTER
Received approval for OP 5 pod called and LVM for pharmacy approval 10/19/2023-10/809901 sending to scan     [facility] aware

## 2023-10-19 NOTE — PROGRESS NOTES
Attempted to contact patient for scheduled telehealth visit. LVM for patient to contact office to reschedule. Advised patient to take his basal insulin as prescribed. Updated patient on approval of OmniPod supplies and to pick  up from pharmacy. Advised patient to contact [employer] on lost PDM to inquire on programs to obtain new one.     Paul Álvarez APRN, BC-ADM, CDCES

## 2023-10-20 NOTE — TELEPHONE ENCOUNTER
LVM for pt that pump supplies are approved and that he needs to pickup supplies from pharmacy. Told him we will reach out to coordinate education.  Glenda Stewart will talk to Deacon Huertas today about scheduling education w pt

## 2023-10-24 ENCOUNTER — TELEPHONE (OUTPATIENT)
Dept: ENDOCRINOLOGY CLINIC | Facility: CLINIC | Age: 23
End: 2023-10-24

## 2023-10-24 NOTE — TELEPHONE ENCOUNTER
Contacted pt to check in to see if he found his Omnipod 5 Controller. States he has not found it yet. Also notes he has an iPhone. Discussed w/pt that currently the iPhone is not compatible with the Omnipod 5 holland, but is likely to be available 1/2024. Advised pt to contact [facility] number ([phone]) to see if they can assist him with getting a new controller. Also provided phone number to pt. Discussed with pt to contact our office once he either finds his controller or receives a new one from [address] [place] [address] Ave  so we can schedule an appointment with the diabetes educator to have his settings entered and re-start him on his Omnipod 5 pump.

## 2023-11-15 DIAGNOSIS — E10.65 UNCONTROLLED TYPE 1 DIABETES MELLITUS WITH HYPERGLYCEMIA (HCC): ICD-10-CM

## 2023-11-16 RX ORDER — INSULIN LISPRO 100 [IU]/ML
INJECTION, SOLUTION INTRAVENOUS; SUBCUTANEOUS
Qty: 30 ML | Refills: 2 | Status: SHIPPED | OUTPATIENT
Start: 2023-11-16

## 2023-11-16 NOTE — TELEPHONE ENCOUNTER
Requested Prescriptions     Pending Prescriptions Disp Refills    INSULIN LISPRO 100 UNIT/ML Injection Solution [Pharmacy Med Name: INSULIN LISPRO 100U/ML VIAL 10ML] 30 mL 2     Si-150 UNITS DAILY VIA INSULIN PUMP       Last A1c value was 14% done 2023.     Refill 23  LOV 10/19/23 tele  No FU

## 2023-12-13 DIAGNOSIS — E10.65 TYPE 1 DIABETES MELLITUS WITH HYPERGLYCEMIA (HCC): ICD-10-CM

## 2023-12-14 RX ORDER — INSULIN GLARGINE 100 [IU]/ML
INJECTION, SOLUTION SUBCUTANEOUS
Qty: 15 ML | Refills: 0 | Status: SHIPPED | OUTPATIENT
Start: 2023-12-14

## 2023-12-14 NOTE — TELEPHONE ENCOUNTER
Requested Prescriptions     Pending Prescriptions Disp Refills    insulin glargine (LANTUS SOLOSTAR) 100 UNIT/ML Subcutaneous Solution Pen-injector [Pharmacy Med Name: Minerva Stovers PEN INJ 3ML] 15 mL 0     Sig: INJECT AS DIRECTED DAILY UP TO 20 UNITS     Future Appointments   Date Time Provider Antonella Sanchez   1/5/2024  2:15 PM QAMAR Moulton [facility]     Last A1c value was 14% done 8/24/2023.   Refill 10/3/23  LOV 10/19/23

## 2024-01-02 DIAGNOSIS — E10.65 TYPE 1 DIABETES MELLITUS WITH HYPERGLYCEMIA (HCC): Primary | ICD-10-CM

## 2024-01-04 ENCOUNTER — TELEPHONE (OUTPATIENT)
Dept: ENDOCRINOLOGY CLINIC | Facility: CLINIC | Age: 24
End: 2024-01-04

## 2024-02-23 DIAGNOSIS — E10.65 TYPE 1 DIABETES MELLITUS WITH HYPERGLYCEMIA (HCC): ICD-10-CM

## 2024-02-26 ENCOUNTER — TELEPHONE (OUTPATIENT)
Dept: ENDOCRINOLOGY CLINIC | Facility: CLINIC | Age: 24
End: 2024-02-26

## 2024-02-26 RX ORDER — INSULIN GLARGINE 100 [IU]/ML
INJECTION, SOLUTION SUBCUTANEOUS
Qty: 15 ML | Refills: 0 | Status: SHIPPED | OUTPATIENT
Start: 2024-02-26

## 2024-02-26 NOTE — TELEPHONE ENCOUNTER
No FU on file, pt cancelled appt with DH 1/2024 and we have been unable to get in contact with pt. Pended warning letter per DH

## 2024-02-26 NOTE — TELEPHONE ENCOUNTER
Requested Prescriptions     Pending Prescriptions Disp Refills    insulin glargine (LANTUS SOLOSTAR) 100 UNIT/ML Subcutaneous Solution Pen-injector [Pharmacy Med Name: LANTUS SOLOSTAR PEN INJ 3ML] 15 mL 0     Sig: ADMINISTER UP TO 20 UNITS UNDER THE SKIN DAILY AS DIRECTED       Last A1c value was 14% done 8/24/2023.    Refill 12/14/23  LOV 10/19/23

## 2024-02-26 NOTE — TELEPHONE ENCOUNTER
Warning letter approved and signed by provider.  If no response in 2 weeks, dismiss patient and provide information for endocrinology.    Paris FOOTE, BC-ADM, [facility]

## 2024-05-01 DIAGNOSIS — E10.65 TYPE 1 DIABETES MELLITUS WITH HYPERGLYCEMIA (HCC): ICD-10-CM

## 2024-05-01 RX ORDER — INSULIN GLARGINE 100 [IU]/ML
INJECTION, SOLUTION SUBCUTANEOUS
Qty: 15 ML | Refills: 0 | Status: SHIPPED | OUTPATIENT
Start: 2024-05-01 | End: 2024-05-02

## 2024-05-01 NOTE — TELEPHONE ENCOUNTER
Requested Prescriptions     Pending Prescriptions Disp Refills    LANTUS SOLOSTAR 100 UNIT/ML Subcutaneous Solution Pen-injector [Pharmacy Med Name: LANTUS SOLOSTAR PEN INJ 3ML] 15 mL 0     Sig: ADMINISTER UP TO 20 UNITS UNDER THE SKIN DAILY AS DIRECTED     Your appointments       Date & Time Appointment Department ([place])    May 02, 2024 2:30 PM CDT Diabetes Pump follow up with Paris Arenas APRN [facility] ([facility] [place])              [facility]  [facility] [place]  [address] [facility] [id number] [phone]          Last A1c value was 14% done 8/24/2023.    Refill 2/26/24  LOV 10/19/23

## 2024-05-02 ENCOUNTER — LAB ENCOUNTER (OUTPATIENT)
Dept: LAB | Age: 24
End: 2024-05-02
Attending: NURSE PRACTITIONER
Payer: MEDICAID

## 2024-05-02 ENCOUNTER — OFFICE VISIT (OUTPATIENT)
Dept: ENDOCRINOLOGY CLINIC | Facility: CLINIC | Age: 24
End: 2024-05-02
Payer: MEDICAID

## 2024-05-02 VITALS
SYSTOLIC BLOOD PRESSURE: 138 MMHG | WEIGHT: 137 LBS | BODY MASS INDEX: 20 KG/M2 | HEART RATE: 90 BPM | RESPIRATION RATE: 16 BRPM | OXYGEN SATURATION: 98 % | DIASTOLIC BLOOD PRESSURE: 62 MMHG

## 2024-05-02 DIAGNOSIS — E10.65 TYPE 1 DIABETES MELLITUS WITH HYPERGLYCEMIA (HCC): ICD-10-CM

## 2024-05-02 DIAGNOSIS — E10.65 TYPE 1 DIABETES MELLITUS WITH HYPERGLYCEMIA (HCC): Primary | ICD-10-CM

## 2024-05-02 LAB
CARTRIDGE LOT#: 660 NUMERIC
GLUCOSE BLOOD: 91
HEMOGLOBIN A1C: 12.7 % (ref 4.3–5.6)
TEST STRIP LOT #: NORMAL NUMERIC

## 2024-05-02 PROCEDURE — 80061 LIPID PANEL: CPT

## 2024-05-02 PROCEDURE — 82570 ASSAY OF URINE CREATININE: CPT

## 2024-05-02 PROCEDURE — 82043 UR ALBUMIN QUANTITATIVE: CPT

## 2024-05-02 PROCEDURE — 82947 ASSAY GLUCOSE BLOOD QUANT: CPT | Performed by: NURSE PRACTITIONER

## 2024-05-02 PROCEDURE — 80053 COMPREHEN METABOLIC PANEL: CPT

## 2024-05-02 PROCEDURE — 36415 COLL VENOUS BLD VENIPUNCTURE: CPT

## 2024-05-02 PROCEDURE — 99215 OFFICE O/P EST HI 40 MIN: CPT | Performed by: NURSE PRACTITIONER

## 2024-05-02 PROCEDURE — 83036 HEMOGLOBIN GLYCOSYLATED A1C: CPT | Performed by: NURSE PRACTITIONER

## 2024-05-02 RX ORDER — PROCHLORPERAZINE 25 MG/1
1 SUPPOSITORY RECTAL
Qty: 1 EACH | Refills: 3 | Status: SHIPPED | OUTPATIENT
Start: 2024-05-02

## 2024-05-02 RX ORDER — INSULIN GLARGINE 100 [IU]/ML
INJECTION, SOLUTION SUBCUTANEOUS
Qty: 15 ML | Refills: 0 | Status: SHIPPED | OUTPATIENT
Start: 2024-05-02

## 2024-05-02 RX ORDER — INSULIN PMP CART,AUT,G6/7,CNTR
1 EACH SUBCUTANEOUS
Qty: 10 EACH | Refills: 5 | Status: SHIPPED | OUTPATIENT
Start: 2024-05-02

## 2024-05-02 RX ORDER — PROCHLORPERAZINE 25 MG/1
1 SUPPOSITORY RECTAL
Qty: 9 EACH | Refills: 3 | Status: SHIPPED | OUTPATIENT
Start: 2024-05-02

## 2024-05-02 NOTE — PROGRESS NOTES
HPI:   Ángel Moscoso is a 23 year old male who presents for initial visit with provider for the management of his diabetes.  Patient has not been seen by provider since 10/2023.  Patient has not had labs done.  Patient is not currently using insulin pump or cgm therapy.    Chief Complaint   Patient presents with    Diabetes     Follow up (forgot meter) BG in office 91       Diabetes: uncontrolled  Type: 1   Duration: dx age 12  Current Meds: Humalog ICR 1u:15g - patient is not dosing for correction only carbs, Lantus - patient is not taking, Gvoke for treatment of severe hypoglycemia     Patient not currently using OmniPod  Long term insulin use: yes  Failed Meds/Previous Tx: T-slim & Medtronic pumps, Admelog - rash per patient, Humalog 50/50    Complications: Hospitalizations for blood glucose, hx DKA    *Patient is not using continuous glucose monitoring or monitoring blood glucose at all.       Overall glucose control:   HGBA1C:    Lab Results   Component Value Date    A1C 12.7 (A) 05/02/2024    A1C 14.0 (A) 08/24/2023    A1C 13.7 (A) 05/24/2023     (H) 08/22/2022        Wt Readings from Last 3 Encounters:   05/02/24 137 lb (62.1 kg)   10/03/23 136 lb (61.7 kg)   08/24/23 138 lb 9.6 oz (62.9 kg)     BP Readings from Last 3 Encounters:   05/02/24 138/62   10/03/23 102/62   08/24/23 124/62          Past History:   He  has a past medical history of Acute nasopharyngitis, Allergic rhinitis, Ankle pain, Backache, DKA, type 1 (HCC) (04/23/2014), Earache, Fracture, Rash, Sore throat, and Type 1 diabetes mellitus (HCC).   His family history includes Diabetes in his maternal aunt, maternal cousin female, maternal grandfather, maternal grandmother, and maternal uncle.   He  reports that he has never smoked. He has never used smokeless tobacco. He reports that he does not drink alcohol and does not use drugs.     He is allergic to admelog [insulin lispro, human] and amoxicillin.     Current Outpatient Medications  on File Prior to Visit   Medication Sig    INSULIN LISPRO 100 UNIT/ML Injection Solution 100-150 UNITS DAILY VIA INSULIN PUMP    [DISCONTINUED] insulin glargine (LANTUS SOLOSTAR) 100 UNIT/ML Subcutaneous Solution Pen-injector ADMINISTER UP TO 20 UNITS UNDER THE SKIN DAILY AS DIRECTED (Patient not taking: Reported on 5/2/2024)    [DISCONTINUED] insulin glargine (LANTUS SOLOSTAR) 100 UNIT/ML Subcutaneous Solution Pen-injector ADMINISTER UP TO 20 UNITS UNDER THE SKIN DAILY AS DIRECTED    glucagon (GVOKE HYPOPEN 2-PACK) 1 MG/0.2ML Subcutaneous SUBQ injection Inject 0.2 mL (1 mg total) into the skin once as needed for Low blood glucose.    [DISCONTINUED] Continuous Blood Gluc Sensor (DEXCOM G6 SENSOR) Does not apply Misc 1 each Every 10 days.    [DISCONTINUED] Continuous Blood Gluc Transmit (DEXCOM G6 TRANSMITTER) Does not apply Misc 1 each every 3 (three) months.    OneTouch Delica Lancets 30G Does not apply Misc 1 each 4 (four) times daily.    Glucose Blood (ONETOUCH VERIO) In Vitro Strip 1 each by Finger stick route 4 (four) times daily. Check 3 times daily    albuterol 108 (90 Base) MCG/ACT Inhalation Aero Soln Inhale 2 puffs into the lungs every 4 (four) hours as needed for Wheezing.    Urine Glucose-Ketones Test In Vitro Strip Use as directed during sick days    Insulin Pen Needle (EASY TOUCH SAFETY PEN NEEDLES) 29G X 8MM Does not apply Misc QID with insulin injections    [DISCONTINUED] Glucose Blood (RELION PREMIERE TEST) In Vitro Strip Test blood glucose 3-4 times per day prior to meals and for signs/symptoms of hypoglycemia (Patient not taking: Reported on 10/3/2023)     No current facility-administered medications on file prior to visit.       CMP  (most recent labs)   Lab Results   Component Value Date/Time     (H) 08/22/2022 06:00 AM    BUN 17 08/22/2022 06:00 AM    CREATSERUM 1.00 08/22/2022 06:00 AM    GFRNAA 119 05/20/2021 12:24 PM    GFRAA 137 05/20/2021 12:24 PM    EGFRCR 110 08/22/2022 06:00 AM     CA 8.5 08/22/2022 06:00 AM    ALKPHO 76 08/22/2022 06:00 AM    AST 25 08/22/2022 06:00 AM    ALT 36 08/22/2022 06:00 AM    BILT 0.9 08/22/2022 06:00 AM    TP 6.1 (L) 08/22/2022 06:00 AM    ALB 2.9 (L) 08/22/2022 06:00 AM     08/22/2022 06:00 AM    K 3.3 (L) 08/22/2022 06:00 AM     08/22/2022 06:00 AM    CO2 26.0 08/22/2022 06:00 AM           Lipids  (most recent labs)   No results found for: \"CHOLEST\", \"TRIG\", \"HDL\", \"LDL\", \"NONHDLC\"       Diabetes  (most recent labs)   Lab Results   Component Value Date/Time    A1C 12.7 (A) 05/02/2024 02:41 PM          Microalb (most recent labs)   Lab Results   Component Value Date/Time    MICROALBCREA 6.2 07/20/2023 03:50 PM        Lab results reviewed with patient.    REVIEW OF SYSTEMS:   GENERAL HEALTH: feels well otherwise  SKIN: denies any unusual skin lesions or rashes  RESPIRATORY: denies shortness of breath with exertion  CARDIOVASCULAR: denies chest pain on exertion  GI: denies nausea, abdominal pain or heartburn  NEURO: denies headaches and denies numbness and tingling to extremities  ENDO: c/o polydipsia and polyuria, denies polyphagia     EXAM:   /62   Pulse 90   Resp 16   Wt 137 lb (62.1 kg)   SpO2 98%   BMI 20.23 kg/m²  Estimated body mass index is 20.23 kg/m² as calculated from the following:    Height as of 8/30/22: 5' 9\" (1.753 m).    Weight as of this encounter: 137 lb (62.1 kg).   Physical Exam  Vitals reviewed.   Constitutional:       Appearance: Normal appearance.   Cardiovascular:      Pulses:           Dorsalis pedis pulses are 2+ on the right side and 2+ on the left side.   Pulmonary:      Effort: Pulmonary effort is normal.   Feet:      Right foot:      Protective Sensation: 5 sites tested.  5 sites sensed.      Skin integrity: Skin integrity normal.      Toenail Condition: Right toenails are normal.      Left foot:      Protective Sensation: 5 sites tested.  5 sites sensed.      Skin integrity: Skin integrity normal.      Toenail  Condition: Left toenails are normal.      Comments: Diabetes foot exam performed: Vibration to dorsum to the first toe perceived bilaterally.  Foot care practices reviewed with patient.    Neurological:      Mental Status: He is alert and oriented to person, place, and time.   Psychiatric:         Mood and Affect: Mood normal.         Behavior: Behavior normal.         ASSESSMENT AND PLAN:   As for his Diabetes, it is poorly controlled, needs further observation, needs improvement, patient poorly compliant, needs to follow diet more regularly  Recommendations are: see ophthalmology soon, check feet daily, and will check labs as ordered    Discussed use of basal/bolus insulin and pathophysiology of a healthy pancreas vs type 1 DM.    Patient to restart Lantus at 7 units injection daily and change Humalog dosing to ICR 1u:20g and ISF 1u:50 mg/dl for target of 120 mg/dl until insulin pump therapy resumed.      Patient to be scheduled to meet with Rohit Gamble to resume OmniPod 5 therapy.  New prescription for pods sent to pharmacy.    Patient to resume use of personal Dexcom G6 CGM for glucose monitoring.  Discussed self monitoring blood glucose and the importance of monitoring.  Patient agreed to monitoring qid - before meals and 2 hours postprandial of one meal per day if not using CGM.  New sensor and transmitter sent to pharmacy.  Test strip prescription sent to pharmacy.  Reinforced checking glucose with fingerstick blood glucose if sensor reading seems unlikely or inaccurate.    Hypoglycemia S&S, Rx, and when to call APRN/CDE reviewed using Rule of 15's. Stressed need to call if 2 readings below 80 mg/dl in 1 week for medication adjustment.  Use Gvoke as needed for treatment of severe hypoglycemia.    Reinforced healthy eating in healthy portions and increasing daily physical activity.    Overview of complications of uncontrolled diabetes including foot, eye, dental, renal and lipid monitoring, and  skin care reviewed.    Resources provided to patient for ADA and Diabetes and work due to current issues with discrimination in his work environment.    Patient to have labs done    This patient's poorly controlled diabetes/glucose is not related to the clinical staff not providing the resources, clinical decisions/recommendations as well as educational tools necessary to control or improve glucose levels but may be more related to the patient NOT showing for appointments, following the prescribed mediation regimen, missed or mis timing of insulin doses, not testing, and /or poor dietary habits.   This patient has been counseled on carbohydrate counting, food label reading, importance of testing, importance of prescription adherence and the overall importance of controlling their blood sugars to minimize or avoid progression of complications.        DM Health Maintenance  Last dilated eye exam: due - patient aware  Date of last PHQ-2 depression screen: PHQ-2 - Date of last depression screenin2023    Patient  reports that he has never smoked. He has never used smokeless tobacco.  When is flu vaccine due? No recommendations at this time  When is pneumonia vaccine due? Pneumococcal Vaccination(1 of 2 - PCV) Never done    The patient indicates understanding of these issues and agrees to the plan.  Refills addressed at time of office visit.    Diagnoses and all orders for this visit:    Type 1 diabetes mellitus with hyperglycemia (HCC)  -     HEMOGLOBIN A1C  -     ASSAY QUANTITATIVE, GLUCOSE  -     Microalb/Creat Ratio, Random Urine; Future  -     Continuous Glucose Sensor (DEXCOM G6 SENSOR) Does not apply Misc; 1 each Every 10 days.  -     Continuous Glucose Transmitter (DEXCOM G6 TRANSMITTER) Does not apply Misc; 1 each every 3 (three) months.  -     Insulin Disposable Pump (OMNIPOD 5 G6 PODS, GEN 5,) Does not apply Misc; 1 each every 3 (three) days.  -     insulin glargine (LANTUS SOLOSTAR) 100 UNIT/ML  Subcutaneous Solution Pen-injector; Inject daily as directed up to TDD = 20 units  -     Comp Metabolic Panel (14) [E]; Future  -     Lipid Panel [E]; Future       Return in about 29 days (around 5/31/2024) for 45 minute appointment, Personal CGM, Insulin pump, Virtual Visit.    The risks and benefits of my recommendations, as well as other treatment options were discussed with the patient today. Questions were answered to the best of my knowledge.   55 min spent with patient and >50% time spent counseling and coordinating care related to their office visit.      Paris FOOTE, BC-ADM, [facility]

## 2024-05-02 NOTE — PATIENT INSTRUCTIONS
We are here to support you with Diabetes but please remember that you still need your primary care doctor for your routine health maintenance.   Your current A1C: 12.7%  This test provides us with your average blood sugar for the past 3 months.   The main goal of diabetes treatment is to keep your sugar from going too high. We measure your overall blood sugar trends with a Hemoglobin A1C test. (also called an A1C)  For most people the target is less than 7.0% but sometimes we make exceptions based on age, health history and other factors.   Keeping an A1C less than 7% helps prevents diabetes related health problems.   If your A1C goes too high, then we need to talk about changing your current diabetes treatment.    MEDICATIONS:   It is important to take all of your medications as prescribed.   Please call me if you cannot get the prescriptions filled or are having issues with refills.   Also, please call me if you have any issues with medication questions, side effects, dosing questions or problems with your blood sugar trends BEFORE CHANGING OR STOPPING ANY MEDICATIONS.   Restart Lantus 7 units injection daily  Change Humalog dosing to ICR 1u:20g and ISF 1u:50 mg/dl for target of 120 mg/dl until insulin pump therapy resumed    Blood sugar testing:   Always bring your glucose meter or blood sugar logbook to every appointment here at the diabetes center.  This allows me to safely make adjustments to your diabetes plan.   In order for me to determine any patterns in your blood sugars, you will need to use Dexcom CGM or test your blood sugar 4 times daily   Call if 2 readings below 80 mg/dl in 1 week for medication adjustment.   Use Gvoke for treatment of severe hypoglycemia    Blood sugar targets:  Before breakfast:   (preferably < 110)  2 hours After meals: less than 180 (preferably less than 150)   Call for persistent blood sugars < 75 or > 200.   Blood sugars greater than 200 are not acceptable to reach your  goal of improving diabetes      Health Maintenance:   1. LABS: It is important to monitor your kidney function (blood and urine protein levels) , liver function tests and cholesterol levels when you have diabetes.     2. FOOT EXAMS:  daily foot inspections for foot wounds or skin changes are important for foot care. Any unusual changes should be reported immediately.    3. EYE EXAMS: Checking your eyes for diabetes changes is important and you should have a dilated eye exam done by an eye doctor EVERY year since these changes occur in the BACK of the eye and not visible by you.  Please let me know if you need provider list for eye doctor.     Lifestyle Therapy:    1. NUTRITION: Maintain optimal weight, calorie restriction if overweight, plant-based diet    2. PHYSICAL ACTIVITY: 150 minutes per week (30 minutes a day 5 days a week) of moderate exertion such as walking, stair climbing.  Include strength training 2-3 times per week.  Increase as tolerated.    3. SLEEP: Try and get 7-8 hours of sleep per night    4. BEHAVIOR:  Tobacco cessation and alcohol in moderation      Reminders:  Have labs done  Meet with Adia Gibbs to restart OmniPod  Have eye exam done    Contact Helio Leos LCSW   Behavioral Health Clinician,   [facility]   Phone: [phone]   Fax: [phone]       Paris FOOTE, BC-[facility], [facility]  [address] [place] [address]  [address], [address] [place] [address]  [address] [place] [address]  Diabetes Services at [facility] [phone]  F [phone]

## 2024-05-03 LAB
ALBUMIN SERPL-MCNC: 3.2 G/DL (ref 3.4–5)
ALBUMIN/GLOB SERPL: 1 {RATIO} (ref 1–2)
ALP LIVER SERPL-CCNC: 90 U/L
ALT SERPL-CCNC: 34 U/L
ANION GAP SERPL CALC-SCNC: 9 MMOL/L (ref 0–18)
AST SERPL-CCNC: 23 U/L (ref 15–37)
BILIRUB SERPL-MCNC: 0.7 MG/DL (ref 0.1–2)
BUN BLD-MCNC: 12 MG/DL (ref 9–23)
CALCIUM BLD-MCNC: 8.9 MG/DL (ref 8.5–10.1)
CHLORIDE SERPL-SCNC: 111 MMOL/L (ref 98–112)
CHOLEST SERPL-MCNC: 163 MG/DL (ref ?–200)
CO2 SERPL-SCNC: 26 MMOL/L (ref 21–32)
CREAT BLD-MCNC: 1.04 MG/DL
CREAT UR-SCNC: 323 MG/DL
EGFRCR SERPLBLD CKD-EPI 2021: 104 ML/MIN/1.73M2 (ref 60–?)
FASTING PATIENT LIPID ANSWER: NO
FASTING STATUS PATIENT QL REPORTED: NO
GLOBULIN PLAS-MCNC: 3.3 G/DL (ref 2.8–4.4)
GLUCOSE BLD-MCNC: 140 MG/DL (ref 70–99)
HDLC SERPL-MCNC: 67 MG/DL (ref 40–59)
LDLC SERPL CALC-MCNC: 78 MG/DL (ref ?–100)
MICROALBUMIN UR-MCNC: 7.64 MG/DL
MICROALBUMIN/CREAT 24H UR-RTO: 23.7 UG/MG (ref ?–30)
NONHDLC SERPL-MCNC: 96 MG/DL (ref ?–130)
OSMOLALITY SERPL CALC.SUM OF ELEC: 304 MOSM/KG (ref 275–295)
POTASSIUM SERPL-SCNC: 3.7 MMOL/L (ref 3.5–5.1)
PROT SERPL-MCNC: 6.5 G/DL (ref 6.4–8.2)
SODIUM SERPL-SCNC: 146 MMOL/L (ref 136–145)
TRIGL SERPL-MCNC: 98 MG/DL (ref 30–149)
VLDLC SERPL CALC-MCNC: 15 MG/DL (ref 0–30)

## 2024-05-09 ENCOUNTER — TELEPHONE (OUTPATIENT)
Dept: ENDOCRINOLOGY CLINIC | Facility: CLINIC | Age: 24
End: 2024-05-09

## 2024-05-09 NOTE — TELEPHONE ENCOUNTER
Received fax from jacey caraballo with OP containing pump therapy form. She states it appears pt self started pump 12/5/2022 and that he is waiting to receive pod refills and dexcom refills - currently injectin ginsulin.    Jacey needs the form signed, once patient receives his supplies she will meet with him for training    Gave form to Paris to complete    Will call [facility] after their meal break to make sure the refills we sent for supplies on 5/2/24 are going through

## 2024-05-09 NOTE — TELEPHONE ENCOUNTER
Form filled out by denia for transfer of settings, faxed to griffin caraballo at [phone]. Copy to scan and to folder    Called [facility] - [employer] sensor and transmitter going through at no charge, OP also went through but they have to order it so it will be a couple days    Called patient and updated him on his supplies, also informed him that griffin will be reaching out to him to arrange training

## 2024-05-23 DIAGNOSIS — E10.65 UNCONTROLLED TYPE 1 DIABETES MELLITUS WITH HYPERGLYCEMIA (HCC): ICD-10-CM

## 2024-05-23 RX ORDER — PEN NEEDLE, DIABETIC 32GX 5/32"
NEEDLE, DISPOSABLE MISCELLANEOUS
Qty: 150 EACH | Refills: 1 | Status: SHIPPED | OUTPATIENT
Start: 2024-05-23

## 2024-05-23 RX ORDER — INSULIN LISPRO 100 [IU]/ML
INJECTION, SOLUTION INTRAVENOUS; SUBCUTANEOUS
Qty: 30 ML | Refills: 2 | Status: SHIPPED | OUTPATIENT
Start: 2024-05-23

## 2024-05-23 NOTE — TELEPHONE ENCOUNTER
Call from patient's mother.  He is almost out of humalog.  States patient does not currently have OP5 pdm controller (per note back in October, PDM was lost and it could cost up to $150 to replace).  [employer] ios per Adia should be available in June so he would not need pdm/controller once that is approved.  Per mom, [facility] told her they don't distribute insulin pumps.  Will contact pharmacy also.  Routing refill to Paris, pen needles sent until starts on pump.    Call with pharmacy, they can order the OP5 pods for him.  Informed mother.

## 2024-05-29 ENCOUNTER — TELEPHONE (OUTPATIENT)
Dept: ENDOCRINOLOGY CLINIC | Facility: CLINIC | Age: 24
End: 2024-05-29

## 2024-05-29 NOTE — TELEPHONE ENCOUNTER
Pt has VV with  Friday - last dexcom data 11/26/23. Per , needs to be up to date on dexcom. Lvm for pt to please call back about connecting with our clinic on dexcom

## 2024-05-30 NOTE — TELEPHONE ENCOUNTER
Decrease tranxene (clorazepate) to 1/2 pill twice a day  Continue topamax 150 mg (1 and 1/2) twice a day  Continue lamictal 150mg pills- 1 pill twice a day   Pt hasn't been using dexcom, he has had trouble getting all of his supplies.    Rescheduled VV with DH to 7/19    Sounds like we are hoping to wait for OP5 until compatible with iphone in June?    Called [facility] to check if dexcom supplies are going through, both g6 sensor and transmitter are going through. They need to order them, provided pharmacist with pt's phone # as the only one they had was for his mom. Asked them to send pt a text to notify when supplies are ready.

## 2024-07-17 ENCOUNTER — TELEPHONE (OUTPATIENT)
Dept: ENDOCRINOLOGY CLINIC | Facility: CLINIC | Age: 24
End: 2024-07-17

## 2024-07-17 NOTE — TELEPHONE ENCOUNTER
Pt is scheduled for VV with  7/19 - still is not set up on dexcom    Per , would like to refer to endocrinology due to continued non adherence and uncontrolled diabetes management    Lvm for pt that we are referring him to endo - call us back with questions, also provided endo's # to schedule

## 2024-08-01 NOTE — TELEPHONE ENCOUNTER
Sent via regular and certified mail.  Article [id number] [id number]  Certified receipt sent to scan.

## 2024-08-01 NOTE — TELEPHONE ENCOUNTER
Please send pended letter via certified and regular mail.    Paris FOOTE, BC-ADM, [facility]

## 2024-08-19 ENCOUNTER — TELEPHONE (OUTPATIENT)
Facility: CLINIC | Age: 24
End: 2024-08-19

## 2024-08-19 ENCOUNTER — OFFICE VISIT (OUTPATIENT)
Facility: CLINIC | Age: 24
End: 2024-08-19
Payer: MEDICAID

## 2024-08-19 ENCOUNTER — HOSPITAL ENCOUNTER (EMERGENCY)
Facility: HOSPITAL | Age: 24
Discharge: HOME | End: 2024-08-19
Attending: EMERGENCY MEDICINE
Payer: MEDICAID

## 2024-08-19 VITALS
DIASTOLIC BLOOD PRESSURE: 88 MMHG | HEIGHT: 67 IN | BODY MASS INDEX: 21.19 KG/M2 | SYSTOLIC BLOOD PRESSURE: 113 MMHG | OXYGEN SATURATION: 99 % | HEART RATE: 81 BPM | RESPIRATION RATE: 20 BRPM | WEIGHT: 135 LBS | TEMPERATURE: 98 F

## 2024-08-19 VITALS
WEIGHT: 135 LBS | HEIGHT: 67 IN | DIASTOLIC BLOOD PRESSURE: 76 MMHG | SYSTOLIC BLOOD PRESSURE: 128 MMHG | HEART RATE: 88 BPM | OXYGEN SATURATION: 98 % | BODY MASS INDEX: 21.19 KG/M2

## 2024-08-19 DIAGNOSIS — E10.65 UNCONTROLLED TYPE 1 DIABETES MELLITUS WITH HYPERGLYCEMIA (HCC): ICD-10-CM

## 2024-08-19 DIAGNOSIS — E10.65 TYPE 1 DIABETES MELLITUS WITH HYPERGLYCEMIA (HCC): Primary | ICD-10-CM

## 2024-08-19 DIAGNOSIS — E86.0 DEHYDRATION: Primary | ICD-10-CM

## 2024-08-19 DIAGNOSIS — E78.5 HYPERLIPIDEMIA, UNSPECIFIED HYPERLIPIDEMIA TYPE: ICD-10-CM

## 2024-08-19 LAB
ALBUMIN SERPL-MCNC: 4.2 G/DL (ref 3.2–4.8)
ALBUMIN/GLOB SERPL: 1.6 {RATIO} (ref 1–2)
ALP LIVER SERPL-CCNC: 80 U/L
ALT SERPL-CCNC: 43 U/L
ANION GAP SERPL CALC-SCNC: 4 MMOL/L (ref 0–18)
AST SERPL-CCNC: 44 U/L (ref ?–34)
BASOPHILS # BLD AUTO: 0.07 X10(3) UL (ref 0–0.2)
BASOPHILS NFR BLD AUTO: 0.7 %
BILIRUB SERPL-MCNC: 0.9 MG/DL (ref 0.3–1.2)
BUN BLD-MCNC: 12 MG/DL (ref 9–23)
CALCIUM BLD-MCNC: 9.3 MG/DL (ref 8.7–10.4)
CHLORIDE SERPL-SCNC: 109 MMOL/L (ref 98–112)
CO2 SERPL-SCNC: 29 MMOL/L (ref 21–32)
CREAT BLD-MCNC: 0.85 MG/DL
EGFRCR SERPLBLD CKD-EPI 2021: 125 ML/MIN/1.73M2 (ref 60–?)
EOSINOPHIL # BLD AUTO: 0.32 X10(3) UL (ref 0–0.7)
EOSINOPHIL NFR BLD AUTO: 3.4 %
ERYTHROCYTE [DISTWIDTH] IN BLOOD BY AUTOMATED COUNT: 12.9 %
GLOBULIN PLAS-MCNC: 2.6 G/DL (ref 2–3.5)
GLUCOSE BLD-MCNC: 127 MG/DL (ref 70–99)
GLUCOSE BLD-MCNC: 162 MG/DL (ref 70–99)
HCT VFR BLD AUTO: 44.8 %
HEMOGLOBIN A1C: 12.5 % (ref 4.3–5.6)
HGB BLD-MCNC: 15.2 G/DL
IMM GRANULOCYTES # BLD AUTO: 0.02 X10(3) UL (ref 0–1)
IMM GRANULOCYTES NFR BLD: 0.2 %
LYMPHOCYTES # BLD AUTO: 2.6 X10(3) UL (ref 1–4)
LYMPHOCYTES NFR BLD AUTO: 27.6 %
MCH RBC QN AUTO: 30.8 PG (ref 26–34)
MCHC RBC AUTO-ENTMCNC: 33.9 G/DL (ref 31–37)
MCV RBC AUTO: 90.7 FL
MONOCYTES # BLD AUTO: 0.82 X10(3) UL (ref 0.1–1)
MONOCYTES NFR BLD AUTO: 8.7 %
NEUTROPHILS # BLD AUTO: 5.6 X10 (3) UL (ref 1.5–7.7)
NEUTROPHILS # BLD AUTO: 5.6 X10(3) UL (ref 1.5–7.7)
NEUTROPHILS NFR BLD AUTO: 59.4 %
OSMOLALITY SERPL CALC.SUM OF ELEC: 295 MOSM/KG (ref 275–295)
PLATELET # BLD AUTO: 282 10(3)UL (ref 150–450)
POTASSIUM SERPL-SCNC: 4.3 MMOL/L (ref 3.5–5.1)
PROT SERPL-MCNC: 6.8 G/DL (ref 5.7–8.2)
RBC # BLD AUTO: 4.94 X10(6)UL
SODIUM SERPL-SCNC: 142 MMOL/L (ref 136–145)
WBC # BLD AUTO: 9.4 X10(3) UL (ref 4–11)

## 2024-08-19 PROCEDURE — 80053 COMPREHEN METABOLIC PANEL: CPT | Performed by: EMERGENCY MEDICINE

## 2024-08-19 PROCEDURE — 85025 COMPLETE CBC W/AUTO DIFF WBC: CPT | Performed by: EMERGENCY MEDICINE

## 2024-08-19 PROCEDURE — 99283 EMERGENCY DEPT VISIT LOW MDM: CPT

## 2024-08-19 PROCEDURE — 99215 OFFICE O/P EST HI 40 MIN: CPT | Performed by: NURSE PRACTITIONER

## 2024-08-19 PROCEDURE — 83036 HEMOGLOBIN GLYCOSYLATED A1C: CPT | Performed by: NURSE PRACTITIONER

## 2024-08-19 PROCEDURE — 36415 COLL VENOUS BLD VENIPUNCTURE: CPT

## 2024-08-19 PROCEDURE — 82962 GLUCOSE BLOOD TEST: CPT

## 2024-08-19 RX ORDER — PROCHLORPERAZINE 25 MG/1
1 SUPPOSITORY RECTAL
Qty: 9 EACH | Refills: 1 | Status: SHIPPED | OUTPATIENT
Start: 2024-08-19

## 2024-08-19 RX ORDER — INSULIN GLARGINE 100 [IU]/ML
INJECTION, SOLUTION SUBCUTANEOUS
Qty: 30 ML | Refills: 0 | Status: SHIPPED | OUTPATIENT
Start: 2024-08-19

## 2024-08-19 RX ORDER — INSULIN LISPRO 100 [IU]/ML
INJECTION, SOLUTION INTRAVENOUS; SUBCUTANEOUS
Qty: 30 ML | Refills: 2 | Status: CANCELLED | OUTPATIENT
Start: 2024-08-19

## 2024-08-19 RX ORDER — INSULIN LISPRO 100 [IU]/ML
INJECTION, SOLUTION INTRAVENOUS; SUBCUTANEOUS
Qty: 60 ML | Refills: 1 | Status: SHIPPED | OUTPATIENT
Start: 2024-08-19

## 2024-08-19 NOTE — PATIENT INSTRUCTIONS
Follow up plan:  - With MARK Hemphill: Return in about 6 weeks (around 9/30/2024).  [ x ] In person only  [ x ] After visit summary   [ x ] Placed labs/imaging. Labs are to be drawnnon fasting  [ x ] Diabetes Education:   [ x] Pump restart (60)  It was great seeing you today!  - meet with Roberta (our diabetes educator) in 1 weeks for:   Restarting of omnipod pump      - Discharge Instruction:  - get labs now  - if any DKA symptoms arise go to ER  - For now start lantus 7 units /day  Check your blood sugar before you eat. Based on the number, give yourself a dose 15-20 minutes before the meal:  Fixed dose for meal (4 units) 3x a day w/ at least 45 grams of carbs + Correction scale:   <140:  0 units  141-180- 1 units  181-220- 2 units  221-260-3 units  261-300- 4 units  301-340- 5 units  341-380- 6 units  381-420- 7 units  421-460- 8 units  - See the eye doctor  Ophthalmologist for diabetes eye exam:  [facility]: Dr Monika Amador- [address] [facility] [address] [address]- PHONE: [phone]  [facility]: [phone]  Dr. Oppenheim in [place]- [phone]  [facility]: Phone: [phone], Address: [address] [place] [address]  If you have Medicaid: [facility] [place]- [phone], [facility]- [phone]- [facility] [employer], or [employer]  [place]: [facility] - [phone]  Dent: Chilo Dobbs- [employer]- they can do screenings using their machine  [facility]- [phone] - in [place]  Susan Vierling MD - [place]    See the dentist yearly  -   Diabetic Ketoacidosis  Diabetic ketoacidosis (DKA) is a serious problem that can happen in people with diabetes. DKA should be treated as a medical emergency. This is because it can lead to coma or death. If you have the symptoms of DKA, get medical help right away.  DKA happens more often in people with type 1 diabetes. It can also happen to  people with type 2 diabetes who are taking the SLGT-2 inhibitors for diabetes control. It also can happen in women with diabetes during pregnancy (gestational diabetes).  DKA happens when insulin levels are too low. Without enough insulin, sugar (glucose) can’t get to the cells of your body. The glucose stays in the blood. The liver then puts out even more glucose into the blood. This causes high blood glucose (hyperglycemia). Your body breaks down stored fat, when your cells don't get the glucose they need for energy. When this happens, acids called ketones are released into the blood. This is called ketosis. High levels of ketones (ketoacidosis) can be harmful to you. Hyperglycemia and ketoacidosis can also cause serious problems in the blood and your body, such as:  Low levels of potassium and phosphate  Damage to kidneys or other organs  Coma  Dehydration  What causes diabetic ketoacidosis?  In people with diabetes, DKA is most often caused by too little insulin in the body. It's also caused by:  Diabetes that's not under good control  Infections such as a urinary tract infection or pneumonia  Serious health problems such as a heart attack  Reactions to certain prescribed medicines used to treat type 2 diabetes  Reactions to illegal drugs including cocaine  Problems with insulin delivery from an insulin pump  Symptoms of diabetic ketoacidosis  DKA most often happens slowly over time. But it can worsen in a few hours if you are vomiting and dehydrated. The first symptoms are:  Thirst and dry mouth  Urinating a lot  Belly pain  Nausea or vomiting  Breath that smells fruity (from the ketones)  Over time, these symptoms may happen or get worse:  Dry or flushed skin  Nausea and vomiting  Loss of appetite  Weight loss  Belly pain  Trouble breathing or breathing that is deep and rapid  Trouble thinking or confusion  Feeling very tired or weak. This can lead to coma.  How is diabetic ketoacidosis diagnosed?  Your  healthcare provider will ask about your health history. They will give you a physical exam. You may also have these tests:  Blood tests to check your glucose levels and ketones  Blood tests to check your electrolytes, such as potassium, sodium, and bicarbonate  Urine test to check for ketones  Electrocardiogram (ECG)  These tests are done to check for DKA, and watch it over time.  How is diabetic ketoacidosis treated?  DKA needs treatment right away in the hospital. Treatment includes:  Insulin. This is the main type of treatment. Insulin allows the cells to use the glucose in the blood. This lowers the levels of both blood glucose and ketones.  Fluids and electrolytes. These are given by IV (intravenous) line into a vein. Fluids are replaced and abnormal electrolyte levels are corrected.  Other medicines. These may be given to treat an illness that caused DKA. For example, antibiotics may be given to treat a urinary tract infection that caused DKA.  Preventing diabetic ketoacidosis  To help prevent DKA, make sure you:  Take all of your medicines for diabetes exactly as prescribed. This includes insulin.  Check your blood glucose levels exactly as instructed.  Be very careful when you are sick with an illness or an infection. Take extra care to follow diabetes care instructions for sick days. Check your blood glucose more often. Contact your healthcare provider if you have questions about how to manage your diabetes while you are ill.  Don't exercise when your blood sugar is high and you have ketones in your urine.   Check your urine ketone levels if told to do so. This is done with a urine test strip. Ask your provider how often to check your urine.  When to call your healthcare provider  Call your provider right away if you:  Have symptoms of DKA  Have very high blood glucose levels or high levels of ketones in your urine  Are getting sick with another illness  Are confused about how to manage your  diabetes  [facility] last reviewed this educational content on 3/1/2022        Discharge Instructions: Checking for Ketones   Glucose is a kind of sugar from food. It's your body's normal energy source. Your body uses a hormone called insulin to help glucose get into your cells. People with diabetes don't make insulin. Or their body can't use insulin well.   If your body doesn't have enough insulin to use glucose, your body starts burning fat instead. When fat is burned, it makes chemicals called ketones. Ketones can build up in the blood and urine. This buildup can cause a dangerous condition called ketoacidosis. Diabetic ketoacidosis (DKA) can lead to diabetic coma or death. For this reason, you need to check for ketones at times when you are most at risk.    When to check for ketones   Check for ketones, especially if you have type 1 diabetes. Do this when any of the following is true, or as directed by your healthcare provider:   Your blood sugar is above  240 mg/dL.  You are ill or under stress.  You have diarrhea or stomach pain.  You are very thirsty.  You need to urinate often.  You have a dry mouth.  Your breath smells \"fruity.\"   You feel sick or nauseated. Or you have vomited and are becoming dehydrated.  You have run out of your usual diabetes medicines and can't get them right away. This is especially true if you use insulin.   It's also important to check for ketones if you have type 2 diabetes and are taking a certain type of medicine (SGLT-2 inhibitors).   How to check for ketones   Tips for checking for ketones:    Use testing tablets or strips. Different test kits are available from your pharmacy.  Depending on the kit, you can check for ketones in your urine or in your blood. Follow the directions on the packaging.  Record your test results. Show them to your healthcare provider at your next visit.  Call your healthcare provider right away if you test positive for ketones. Follow the directions in  your sick day guidelines on what to do when you have high glucose and ketones.   Do not exercise if you find ketones in your blood or urine and your blood sugar is high.  Urine tests      Urine tests are one way of checking ketones.     Tips for using urine tests:    Follow the package directions carefully.  Use a clean container to get a sample of your urine. You can clean a container by washing it with soap and water.  Place the test strip in the urine sample. Or pass the strip through your urine stream.  Gently shake extra urine off the strip.  Wait for the strip to change color. The directions will tell you how long to wait.  Compare the strip with the color chart on the bottle or package. This gives you a range for the number of ketones in your urine.  Record your results.  Blood tests   Tips for testing your blood:    Use the meter and blood ketone strips as directed by your healthcare provider  Record your results.  To learn more   The resources below can help you learn more:   American Diabetes Association at www.diabetes.org  or 046-159-6466  [employer] at www.hormone.org  or 764-978-7857  Follow-up  Make a follow-up appointment, or as advised.    When to call your healthcare provider   Call your healthcare provider right away or go to the nearest emergency room if any of the following occur:   Moderate to large amounts of ketones, or as advised by your healthcare provider  Fever of 100.4°F (38°C) or higher, or as advised by your healthcare provider  Tiredness (fatigue)  Dry or flushed skin  Nausea or vomiting  Stomach pain  Difficulty breathing  A sweet, fruity odor on your breath  Confusion  Joseph last reviewed this educational content on 7/1/2022 © 2000-2023 [employer]. All rights reserved. This information is not intended as a substitute for professional medical care. Always follow your healthcare professional's instructions.              - Blood sugar targets:  Before  breakfast: , 2 hours after meals: <180 (preferably <150), A1C goal: <7.0%  Time in Range goal is higher than 80% if using a continuous glucose monitor (Dexcom or Bobbi).  Time in Range can be found within the Dexcom G7 holland, on Clarity if using Dexcom G6, or on LibreView if using Bobbi.    - Follow Balanced diet: carbohydrates, protein, healthy fats and fiber in each meal. Exercise of at least 150 mins each week. Decrease your simple carbohydrates intake such as sweets: cookies, soda, candy, white rice, white pasta, syrups    - Decrease saturated fat, trans fat, and cholesterol intake  example: Butter, lard, shortening, coconut, coconut oil, palm oil, fried food, bologna,pepperoni, salami, egg yolks, Poultry skin, visible meat fat.      HOW TO TREAT LOW BLOOD SUGAR (Hypoglycemia)  Low blood sugar= Less than 70, or if you start to have symptoms (below)  Symptoms: Shaking or trembling, fast heart rate, extreme hunger, sweating, confusion/difficulty concentrating, dizziness.    How to treat a low blood sugar if you are able to eat/drink: The Rule of 15  If you are using continuous glucose monitor that says you are low, but you do not have any symptoms, verify on fingerstick that your blood sugar is actually low before treating.   Eat 15 grams of carbs (see examples below)  Check your blood sugar after 15 minutes. If it’s still below your target range, have another serving.   Repeat these steps until it’s in your target range. Once it’s in range, if you're nervous about your sugar going low again, have a protein source (ie, a spoonful of peanut butter).   If you have a CGM you want to look for how your arrow has changed. If you arrow is pointed up or sideways after 15 min, give your CGM more time OR check with a finger stick. Try not to eat more food until at least 15 min after the first BG check - otherwise you risk having a rebound high.  If you are experiencing symptoms and you are unable to check your blood  glucose for any reason, treat the hypoglycemia.  If someone has a low blood sugar and is unconscious: Don’t hesitate to call 911. If someone is unconscious and glucagon is not available or someone does not know how to use it, call 911 immediately.    To treat a low, I recommend you carry with you easy, pre-portioned treatment for low blood sugars that are 15G of carbs:   - Children sized squeeze pouch applesauce (high fiber + carbs help prevent too high of a spike)  - Small children's sized juicebox- 15g carb --> 4oz juice box  - Glucose tablets from [employer], you can find them near diabetes supplies --> Note, you will need to eat 3-4 tablets to get to 15g of carbs  - Children sized fruit snack pack- look for one with 15 grams of total carbohydrate  - Choice of how to treat your low is important. Complex carbs, or foods that contain fats along with carbs (like chocolate) can slow the absorption of glucose and should NOT be used to treat an emergency low    Thank you for visiting our office. We look forward to working with you to reach your health goals. As a reminder, if you need refills, please request early so there is enough time to process the request. We ask that you provide at least 5 days' notice before a refill is due, so there is time to send a request to pharmacy, process the refill, and ensure there are no other problems with obtaining the medication (backorders, prior authorization paperwork, etc). Routine refills will not be addressed on weekends, so please submit these requests during the week.

## 2024-08-19 NOTE — TELEPHONE ENCOUNTER
8/19/24-Received via fax from [facility] a PA for Dexcom G6 Sensor (3pack).  Placed in PA in basket.

## 2024-08-19 NOTE — TELEPHONE ENCOUNTER
PA generated in CMM    KEY: [id number]    Questions answered and sent to plan - will await signing of office note.

## 2024-08-19 NOTE — ED INITIAL ASSESSMENT (HPI)
Patient was following up with amanda FARLEY and found to have large ketones in urine. Sent here for further work up. Patient states he has not been admitted in awhile for his DM1, but he does not have great control over his DM1. He has no other complaints at this time.

## 2024-08-19 NOTE — ED PROVIDER NOTES
Patient Seen in: [facility]      History     Chief Complaint   Patient presents with    Hyperglycemia     Stated Complaint: Ketones in urine - Type 1 diabetic    Subjective:   HPI    Patient had a visit with his endocrinologist MARK today and was advised to go to the ER because he reported feeling fatigued, dehydrated reported some compliance issues with medications and a ketones in the urine.  There is specific concern about DKA.  He denies any nausea, vomiting, Regino pain.  No fevers or chills, no urinary symptoms.      Objective:   Past Medical History:    Acute nasopharyngitis    Allergic rhinitis    Ankle pain    Backache    DKA, type 1 (HCC)    Earache    Fracture    Occult Fx    Rash    Sore throat    Type 1 diabetes mellitus (HCC)              History reviewed. No pertinent surgical history.             Social History     Socioeconomic History    Marital status: Single   Tobacco Use    Smoking status: Former     Types: Cigarettes    Smokeless tobacco: Never   Vaping Use    Vaping status: Former   Substance and Sexual Activity    Alcohol use: Yes     Comment: occassional, last time was a year    Drug use: No     Social Determinants of Health     Food Insecurity: No Food Insecurity (6/21/2024)    Received from [facility]    Food Insecurity     Currently or in the past 3 months, have you worried your food would run out before you had money to buy more?: No     In the past 12 months, have you run out of food or been unable to get more?: No   Transportation Needs: No Transportation Needs (6/21/2024)    Received from [facility]    Transportation Needs     Medical Transportation Needs?: No    Received from [facility], [facility]    Social Connections    Received from [facility], [facility]    Housing Stability              Review of Systems    Positive for stated Chief  Complaint: Hyperglycemia    Other systems are as noted in HPI.  Constitutional and vital signs reviewed.      All other systems reviewed and negative except as noted above.    Physical Exam     ED Triage Vitals [08/19/24 1451]   /80   Pulse 82   Resp 18   Temp 97.7 °F (36.5 °C)   Temp src Temporal   SpO2 97 %   O2 Device None (Room air)       Current Vitals:   Vital Signs  BP: 113/88  Pulse: 81  Resp: 20  Temp: 97.7 °F (36.5 °C)  Temp src: Temporal  MAP (mmHg): 96    Oxygen Therapy  SpO2: 99 %  O2 Device: None (Room air)            Physical Exam    Physical Exam   Constitutional: Awake, alert, well appearing  Head: Normocephalic and atraumatic.   Eyes: Conjunctivae are normal. Pupils are equal, round, and reactive to light.   Neck: Normal range of motion. No JVD  Cardiovascular: Normal rate, regular rhythm  Pulmonary/Chest: Normal effort.  No accessory muscle use.  No cyanosis.  Abdominal: Soft. Not distended.  Neurological: Pt is alert and oriented to person, place, and time. no cranial nerve deficits. Speech fluent      ED Course     Labs Reviewed   COMP METABOLIC PANEL (14) - Abnormal; Notable for the following components:       Result Value    Glucose 127 (*)     AST 44 (*)     All other components within normal limits   POCT GLUCOSE - Abnormal; Notable for the following components:    POC Glucose 162 (*)     All other components within normal limits   CBC WITH DIFFERENTIAL WITH PLATELET             Blood work reviewed, does not suggest DKA.  He is given IV fluids and felt better.  I reviewed outpatient notes from the endocrinologist.  Prescriptions were sent to the pharmacy for insulin for his insulin pump.           MDM          Differential diagnoses considered: Dehydration, hyperglycemia without DKA, DKA were all considered.    -No evidence of DKA or clinically significant dehydration.  Sugars acceptable right now.  -Advised patient to go to the pharmacy after discharge  his medications and  starting them as directed.-        *Discussion of ongoing management of this patient's care included: n/a  *Comorbidities contributing to the complexity of decision making:  type 1 diabetes with insulin pump  *External charts reviewed: outpatient records from endocrinology clinic as noted above  *Additional sources of history: n/a    Shared decision making was done by: patient, myself.                                     Medical Decision Making      Disposition and Plan     Clinical Impression:  1. Dehydration         Disposition:  Discharge  8/19/2024  5:54 pm    Follow-up:  Paris Arenas, APRN  [id number]  [facility] [address]  [phone]    Follow up            Medications Prescribed:  Discharge Medication List as of 8/19/2024  5:57 PM

## 2024-08-20 RX ORDER — INSULIN PMP CART,AUT,G6/7,CNTR
1 EACH SUBCUTANEOUS
Qty: 45 EACH | Refills: 0 | Status: SHIPPED | OUTPATIENT
Start: 2024-08-20 | End: 2024-11-18

## 2024-08-20 NOTE — TELEPHONE ENCOUNTER
PA sent to plan.     Will await determination- Approved today by [facility] 2017  Your request was approved based on the initial information provided at the time of the coverage request submission. Please allow additional time for the final decision to be made and added to the patient's account.    PA approved.    Closing Encounter.

## 2024-08-21 NOTE — TELEPHONE ENCOUNTER
8/21/24-Received via fax from [facility] approval of Dexcom G6 sensor effective 5/22/24-8/20/25.  Sent to scan.

## 2024-08-23 ENCOUNTER — TELEPHONE (OUTPATIENT)
Facility: CLINIC | Age: 24
End: 2024-08-23

## 2024-08-23 NOTE — TELEPHONE ENCOUNTER
Placed call to patient to give instructions for omnipod re-start on Monday, 8/26.  Left voice mail woth instructions as below:    - If takes Lantus in the evening, take 1/2 dose day before pump re-start, if takes in the AM skip does day of pump re-start  - bring 2 pods and 1 vial of short acting insulin

## 2024-09-16 ENCOUNTER — NURSE ONLY (OUTPATIENT)
Facility: CLINIC | Age: 24
End: 2024-09-16
Payer: MEDICAID

## 2024-09-16 DIAGNOSIS — E10.65 TYPE 1 DIABETES MELLITUS WITH HYPERGLYCEMIA (HCC): Primary | ICD-10-CM

## 2024-09-16 RX ORDER — PROCHLORPERAZINE 25 MG/1
SUPPOSITORY RECTAL
Qty: 1 EACH | Refills: 2 | Status: SHIPPED | OUTPATIENT
Start: 2024-09-16

## 2024-09-16 RX ORDER — PROCHLORPERAZINE 25 MG/1
1 SUPPOSITORY RECTAL
Qty: 3 EACH | Refills: 11 | Status: SHIPPED | OUTPATIENT
Start: 2024-09-16

## 2024-09-16 RX ORDER — INSULIN PMP CART,AUT,G6/7,CNTR
1 EACH SUBCUTANEOUS CONTINUOUS PRN
Qty: 1 KIT | Refills: 0 | Status: SHIPPED | OUTPATIENT
Start: 2024-09-16

## 2024-09-16 RX ORDER — INSULIN PMP CART,AUT,G6/7,CNTR
1 EACH SUBCUTANEOUS
Qty: 30 EACH | Refills: 3 | Status: SHIPPED | OUTPATIENT
Start: 2024-09-16

## 2024-09-16 NOTE — PROGRESS NOTES
Omnipod start / mychart set up Follow up    Ángel Moscoso  8/1/2000    Seen by: MARK Hemphill       Connected patient to mychart while on the phone     Patient Concerns/Statements:     - would like to move forward with omnipod 5 and Dexcom G6    Goals / Recommendations:     - Dexcom G6 sent to [facility] on [address]  - Omnipod 5 intro kit and pods sent to [facility] for insurance check   - Patient to reach out to clinic once received supplies to set up omnipod training via video or in person     Future Appointments   Date Time Provider Department Center   9/30/2024  3:15 PM Flory Botello, [facility] [place]        9/16/2024  Roberta Davis RN, MSN, BC-[facility], [facility]  Diabetes Care &      A total of 20 minutes was spent with the patient including chart review, discussion and education / advisement pertinent to patient and provider specified concerns as documented above.     Note to patient: The 21 Century Cures Act makes medical notes like these available to patients in the interest of transparency. However, be advised this is a medical document. It is intended as peer to peer communication. It is written in medical language and may contain abbreviations or verbiage that are unfamiliar. It may appear blunt or direct. Medical documents are intended to carry relevant information, facts as evident, and the clinical opinion of the practitioner.

## 2024-10-11 DIAGNOSIS — E10.65 UNCONTROLLED TYPE 1 DIABETES MELLITUS WITH HYPERGLYCEMIA (HCC): ICD-10-CM

## 2024-10-11 DIAGNOSIS — E10.65 TYPE 1 DIABETES MELLITUS WITH HYPERGLYCEMIA (HCC): ICD-10-CM

## 2024-10-11 RX ORDER — INSULIN LISPRO 100 [IU]/ML
INJECTION, SOLUTION INTRAVENOUS; SUBCUTANEOUS
Qty: 60 ML | Refills: 0 | Status: SHIPPED | OUTPATIENT
Start: 2024-10-11

## 2024-10-11 RX ORDER — INSULIN GLARGINE 100 [IU]/ML
INJECTION, SOLUTION SUBCUTANEOUS
Qty: 15 ML | Refills: 0 | Status: SHIPPED | OUTPATIENT
Start: 2024-10-11

## 2024-10-11 NOTE — TELEPHONE ENCOUNTER
Received phone call from patient's Mother. hospitals patient is in need of Lantus and Lispro. hospitals patient has CGM sensors, but has not started yet.     She states pt knows in need of f/u appt. Will call back office on Monday to get scheduled.     Was unsure if fill available at pharmacy.    Attempt to phone pharmacy- as last fills sent 8/19/24 should have availability, but on hold >40 minutes and still no answer.        
PRE-OP DIAGNOSIS:  Diabetic ulcer of foot with fat layer exposed 08-Aug-2024 15:34:31  Shantell Shin

## 2025-05-22 ENCOUNTER — OFFICE VISIT (OUTPATIENT)
Facility: CLINIC | Age: 25
End: 2025-05-22
Payer: MEDICAID

## 2025-05-22 VITALS
HEART RATE: 93 BPM | DIASTOLIC BLOOD PRESSURE: 68 MMHG | SYSTOLIC BLOOD PRESSURE: 118 MMHG | BODY MASS INDEX: 20.4 KG/M2 | HEIGHT: 67 IN | OXYGEN SATURATION: 98 % | WEIGHT: 130 LBS

## 2025-05-22 DIAGNOSIS — E11.59 HYPERTENSION ASSOCIATED WITH DIABETES (HCC): ICD-10-CM

## 2025-05-22 DIAGNOSIS — E78.2 MIXED DIABETIC HYPERLIPIDEMIA ASSOCIATED WITH TYPE 1 DIABETES MELLITUS (HCC): ICD-10-CM

## 2025-05-22 DIAGNOSIS — Z13.89 SCREENING FOR NEPHROPATHY: ICD-10-CM

## 2025-05-22 DIAGNOSIS — E10.65 TYPE 1 DIABETES MELLITUS WITH HYPERGLYCEMIA, WITH LONG-TERM CURRENT USE OF INSULIN (HCC): Primary | ICD-10-CM

## 2025-05-22 DIAGNOSIS — E10.69 MIXED DIABETIC HYPERLIPIDEMIA ASSOCIATED WITH TYPE 1 DIABETES MELLITUS (HCC): ICD-10-CM

## 2025-05-22 DIAGNOSIS — I15.2 HYPERTENSION ASSOCIATED WITH DIABETES (HCC): ICD-10-CM

## 2025-05-22 LAB — HEMOGLOBIN A1C: 12.7 % (ref 4.3–5.6)

## 2025-05-22 RX ORDER — ACYCLOVIR 400 MG/1
1 TABLET ORAL
Qty: 3 EACH | Refills: 3 | Status: SHIPPED | OUTPATIENT
Start: 2025-05-22

## 2025-05-22 RX ORDER — PEN NEEDLE, DIABETIC 32GX 5/32"
NEEDLE, DISPOSABLE MISCELLANEOUS
Qty: 150 EACH | Refills: 1 | Status: SHIPPED | OUTPATIENT
Start: 2025-05-22

## 2025-05-22 RX ORDER — INSULIN GLARGINE 100 [IU]/ML
INJECTION, SOLUTION SUBCUTANEOUS
Qty: 15 ML | Refills: 0 | Status: SHIPPED | OUTPATIENT
Start: 2025-05-22

## 2025-05-22 RX ORDER — INSULIN LISPRO 100 [IU]/ML
INJECTION, SOLUTION INTRAVENOUS; SUBCUTANEOUS
Qty: 30 ML | Refills: 1 | Status: SHIPPED | OUTPATIENT
Start: 2025-05-22

## 2025-05-22 RX ORDER — GLUCAGON INJECTION, SOLUTION 1 MG/.2ML
1 INJECTION, SOLUTION SUBCUTANEOUS AS NEEDED
Qty: 0.4 ML | Refills: 1 | Status: SHIPPED | OUTPATIENT
Start: 2025-05-22

## 2025-05-22 NOTE — PATIENT INSTRUCTIONS
Return Visit:  APN: Return in about 6 weeks (around 7/3/2025) for diabetes follow up.  [] Video visit  [x] In person only      [x]  Please schedule the following appointment with our dietician, Blanche Cherry in weeks  Please go to room 208 ( next door ofour office) to schedule an appointment with her or call her office no at [phone]  in person or video visits:  [] Inadequate glycemic control/change in treatment (changes in medication, insulin adjustment, further education)  [] Follow up diabetes self- mgmt training ( blood glucose monitoring, medication mgmt/glp1 a follow up)  [] New Onset  DM  [] Medical Nutrition Therapy ( carb counting, meal planning)  [] Insulin or injectable instruction ( insulin teaching)  [x] Continuous glucose monitoring ( placement)  [] Insulin Pump   [] Gestational diabetes/diabetes during pregnancy        [x]  Directions to 1st floor lab      Summary of today's visit:    Last A1c value was 12.7% done 5/22/2025.    Medication changes:    Stop NPH   Lantus 9 units  every morning starting tomorrow morning   Novolog 4 units + sliding scale starting at dinner tonight 3 x a day before meals   Fixed dose for meal (4 units) + Correction scale:   Check your blood sugar before you eat.   Based on the number, give yourself a dose 15-20 minutes before the meal:  Correction scale   Less than 140 = 0 units  140-160= 1 units  161-180= 2 units  181-200= 3 units  201-220= 4 units  221-240= 5 units  241-260= 6 units  261-280= 7 units  281-300= 8 units  301-320= 9 units  321-340= 10 units  341-360= 11 units  361-380= 12 units  381-400= 13 units  400-420= 14 units  IF YOU ARE NOT EATING: do not give 4 units give the sliding scale 3x a day   IF not eating too high of carbs, take 2 units + sliding scale above   - do not give lispro less than 3 hrs apart    - Schedule eye exam prior to your follow up visit  Ophthalmologist for diabetes eye exam:  [facility] ( with various locations:[place]  [place], [place], [place], [place]) :  -  [phone] or [phone]    [place]:   - [facility] ( ophthalmology): Phone: [phone], Address: [address] [place] [address] [address] [address]  - [facility](accepts MEDICAID) ( ophthalmology)- [phone]  - [place] [facility]: Dr. Gruber or Dr. Chaudhari  ( ophthalmology)- [phone]  -[facility]: Dr Monika Amador- PHONE: [phone], [address] [facility] [address] [address]    [place]:  - Dr. Oppenheim( ophthalmology)- [phone]  - [facility] ( optometrist)- [phone]- [place], [employer], or [place]: [facility]( optometrist) - [phone]    [place]: [facility] ( optometrist)- [phone] they can do screenings using their machine    [place]: [facility] ( optometrist)- [phone]     [place]: Makayla Gusman MD ( ophthalmology) -  [phone]       - If we started a new medication today that may not be covered by your insurance, our staff will reach out to you regarding any changes in the plan   - If on insulin, please ensure you have glucagon at home for emergencies   - Targeted glucose levels  - before breakfast or fasting glucose (at least 8-10 hrs of no food/sweetened beverage intake)    -  2 hrs after lunch or dinner < 180   -  before lunch or dinner <140  -  Follow 15g/15 min rule if you develop any hypoglycemia symptoms w/ glucose <70   -  but if glucose <55 follow 30g/15 min rule. Once glucose above 80, eat a protein or food w protein ie cheese, peanut butter, almond butter, cheese, yogurt.     Medication changes  Start Taking               Continuous Glucose Sensor (DEXCOM G7 SENSOR) Does not apply Misc 1 each Every 10 days.    Urine Glucose-Ketones Test In Vitro Strip Use as needed, with ANY episode of VOMITING, during illness, and/or if 2+ BG >250 despite usual correction.     glucagon (GVOKE HYPOPEN 2-PACK) 1 MG/0.2ML Subcutaneous injection Inject 0.2 mL (1 mg total) into the skin as needed. If glucose less than 70 and cannot swallow anything by mouth. Patient trained on Gvoke. Can substitute generic          These Medications Have Changed       Start Taking Instead of    Insulin Lispro, 1 Unit Dial, (HUMALOG KWIKPEN) 100 UNIT/ML Subcutaneous Solution Pen-injector Insulin Lispro, 1 Unit Dial, (HUMALOG KWIKPEN) 100 UNIT/ML Subcutaneous Solution Pen-injector    Dosage:  Inject 4 units + sliding scale provided 3 x a day before meals TDD max 32 units /day Actively titrating per endo instruction Dosage:  Uses up to 100 units daily via insulin pump              Additional comments:   - If labs were ordered today, please complete prior to your follow up visit   - Please let us know if you require any refills at least 1 week prior to your medication running out   - Please call our office if sugars at home are consistently greater than 250 or less than 70     HOW TO TREAT LOW BLOOD SUGAR (Hypoglycemia)  Low blood sugar= Less than 70, or if you start to have symptoms (below)  Symptoms: Shaking or trembling, fast heart rate, extreme hunger, sweating, confusion/difficulty concentrating, dizziness.    How to treat a low blood sugar if you are able to eat/drink: The Rule of 15/15  If you are using continuous glucose monitor that says you are low, but you do not have any symptoms, verify on fingerstick that your blood sugar is actually low before treating.   Eat 15 grams of carbs (see examples below)  Check your blood sugar after 15 minutes. If it’s still below your target range, have another serving.   Repeat these steps until it’s in your target range. Once it’s in range, if you're nervous about your sugar going low again, have a protein source (ie, a spoonful of peanut butter).   If you have a CGM you want to look for how your arrow has changed. If you arrow is pointed up or sideways after 15 min,  give your CGM more time OR check with a finger stick. Try not to eat more food until at least 15 min after the first BG check - otherwise you risk having a rebound high.  If you are experiencing symptoms and you are unable to check your blood glucose for any reason, treat the hypoglycemia.  If someone has a low blood sugar and is unconscious: Don’t hesitate to call 911. If someone is unconscious and glucagon is not available or someone does not know how to use it, call 911 immediately.     To treat a low glucose <70, I recommend you carry with you easy, pre-portioned treatment for low blood sugars that are 15G of carbs:   - Children sized squeeze pouch applesauce (low fiber)  - Small children's sized juicebox- 15g carb --> 4oz juice box  - Glucose tablets from [employer], you can find them near diabetes supplies --> Note, you will need to eat 3-4 tablets to get to 15g of carbs  - Children sized fruit snack pack- look for one with 15 grams of total carbohydrate  - Choice of how to treat your low is important. Complex carbs, or foods that contain fats along with carbs (like chocolate) can slow the absorption of glucose and should NOT be used to treat an emergency low      Diabetic Ketoacidosis  Diabetic ketoacidosis (DKA) is a serious problem that can happen in people with diabetes. DKA should be treated as a medical emergency. This is because it can lead to coma or death. If you have the symptoms of DKA, get medical help right away.  DKA happens more often in people with type 1 diabetes. It can also happen to people with type 2 diabetes who are taking the SLGT-2 inhibitors for diabetes control. It also can happen in women with diabetes during pregnancy (gestational diabetes).  DKA happens when insulin levels are too low. Without enough insulin, sugar (glucose) can’t get to the cells of your body. The glucose stays in the blood. The liver then puts out even more glucose into the blood. This causes high blood  glucose (hyperglycemia). Your body breaks down stored fat, when your cells don't get the glucose they need for energy. When this happens, acids called ketones are released into the blood. This is called ketosis. High levels of ketones (ketoacidosis) can be harmful to you. Hyperglycemia and ketoacidosis can also cause serious problems in the blood and your body, such as:  Low levels of potassium and phosphate  Damage to kidneys or other organs  Coma  Dehydration  What causes diabetic ketoacidosis?  In people with diabetes, DKA is most often caused by too little insulin in the body. It's also caused by:  Diabetes that's not under good control  Infections such as a urinary tract infection or pneumonia  Serious health problems such as a heart attack  Reactions to certain prescribed medicines used to treat type 2 diabetes  Reactions to illegal drugs including cocaine  Problems with insulin delivery from an insulin pump  Symptoms of diabetic ketoacidosis  DKA most often happens slowly over time. But it can worsen in a few hours if you are vomiting and dehydrated. The first symptoms are:  Thirst and dry mouth  Urinating a lot  Belly pain  Nausea or vomiting  Breath that smells fruity (from the ketones)  Over time, these symptoms may happen or get worse:  Dry or flushed skin  Nausea and vomiting  Loss of appetite  Weight loss  Belly pain  Trouble breathing or breathing that is deep and rapid  Trouble thinking or confusion  Feeling very tired or weak. This can lead to coma.  How is diabetic ketoacidosis diagnosed?  Your healthcare provider will ask about your health history. They will give you a physical exam. You may also have these tests:  Blood tests to check your glucose levels and ketones  Blood tests to check your electrolytes, such as potassium, sodium, and bicarbonate  Urine test to check for ketones  Electrocardiogram (ECG)  These tests are done to check for DKA, and watch it over time.  How is diabetic ketoacidosis  treated?  DKA needs treatment right away in the hospital. Treatment includes:  Insulin. This is the main type of treatment. Insulin allows the cells to use the glucose in the blood. This lowers the levels of both blood glucose and ketones.  Fluids and electrolytes. These are given by IV (intravenous) line into a vein. Fluids are replaced and abnormal electrolyte levels are corrected.  Other medicines. These may be given to treat an illness that caused DKA. For example, antibiotics may be given to treat a urinary tract infection that caused DKA.    If there are moderate to large ketones, push 8 ounce of water or SUGAR/Carbohydrates FREE Fluids every hour ( if no history of Heart failure). Continue checking your blood sugar trends every 1- 2 hours.   If at any time, you develop abdominal pain, nausea or vomiting AND have ketones, go to the emergency department to be sure you are not in diabetic ketoacidosis (DKA)  Preventing diabetic ketoacidosis  To help prevent DKA, make sure you:  Take all of your medicines for diabetes exactly as prescribed. This includes insulin.  Check your blood glucose levels exactly as instructed.  Be very careful when you are sick with an illness or an infection. Take extra care to follow diabetes care instructions for sick days. Check your blood glucose more often. Contact your healthcare provider if you have questions about how to manage your diabetes while you are ill.  Don't exercise when your blood sugar is high and you have ketones in your urine.   Check your urine ketone levels if told to do so. This is done with a urine test strip. Ask your provider how often to check your urine.  When to call your healthcare provider  Call your provider right away if you:  Have symptoms of DKA  Have very high blood glucose levels or high levels of ketones in your urine  Are getting sick with another illness  Are confused about how to manage your diabetes  Joseph last reviewed this educational  content on 3/1/2022        Discharge Instructions: Checking for Ketones   Glucose is a kind of sugar from food. It's your body's normal energy source. Your body uses a hormone called insulin to help glucose get into your cells. People with diabetes don't make insulin. Or their body can't use insulin well.   If your body doesn't have enough insulin to use glucose, your body starts burning fat instead. When fat is burned, it makes chemicals called ketones. Ketones can build up in the blood and urine. This buildup can cause a dangerous condition called ketoacidosis. Diabetic ketoacidosis (DKA) can lead to diabetic coma or death. For this reason, you need to check for ketones at times when you are most at risk.    When to check for ketones   Check for ketones, especially if you have type 1 diabetes. Do this when any of the following is true, or as directed by your healthcare provider:   Your blood sugar is above  240 mg/dL.  You are ill or under stress.  You have diarrhea or stomach pain.  You are very thirsty.  You need to urinate often.  You have a dry mouth.  Your breath smells \"fruity.\"   You feel sick or nauseated. Or you have vomited and are becoming dehydrated.  You have run out of your usual diabetes medicines and can't get them right away. This is especially true if you use insulin.   It's also important to check for ketones if you have type 2 diabetes and are taking a certain type of medicine (SGLT-2 inhibitors).   How to check for ketones   Tips for checking for ketones:    Use testing tablets or strips. Different test kits are available from your pharmacy.  Depending on the kit, you can check for ketones in your urine or in your blood. Follow the directions on the packaging.  Record your test results. Show them to your healthcare provider at your next visit.  Call your healthcare provider right away if you test positive for ketones. Follow the directions in your sick day guidelines on what to do when you  have high glucose and ketones.   Do not exercise if you find ketones in your blood or urine and your blood sugar is high.  Urine tests      Urine tests are one way of checking ketones.     Tips for using urine tests:    Follow the package directions carefully.  Use a clean container to get a sample of your urine. You can clean a container by washing it with soap and water.  Place the test strip in the urine sample. Or pass the strip through your urine stream.  Gently shake extra urine off the strip.  Wait for the strip to change color. The directions will tell you how long to wait.  Compare the strip with the color chart on the bottle or package. This gives you a range for the number of ketones in your urine.  Record your results.  Blood tests   Tips for testing your blood:    Use the meter and blood ketone strips as directed by your healthcare provider  Record your results.  To learn more   The resources below can help you learn more:   American Diabetes Association at www.diabetes.org  or 622-960-3208  [employer] at www.hormone.org  or 126-591-7939  Follow-up  Make a follow-up appointment, or as advised.    When to call your healthcare provider   Call your healthcare provider right away or go to the nearest emergency room if any of the following occur:   Moderate to large amounts of ketones, or as advised by your healthcare provider  Fever of 100.4°F (38°C) or higher, or as advised by your healthcare provider  Tiredness (fatigue)  Dry or flushed skin  Nausea or vomiting  Stomach pain  Difficulty breathing  A sweet, fruity odor on your breath  Confusion  Joseph last reviewed this educational content on 7/1/2022 © 2000-2023 [employer]. All rights reserved. This information is not intended as a substitute for professional medical care. Always follow your healthcare professional's instructions.

## 2025-05-22 NOTE — PROGRESS NOTES
[facility]    Name: Ángel Moscoso    Date: 5/22/25    Ángel Moscoso is a 24 year old male who presents for evaluation of T1DM management.     Chief complaint: Follow - Up (PT here for routine T1DM f/u, per patient c/o blurry vision on and off for 1 month, body aches on and off for 1 month patient also states he has been losing weight without trying. Patient has not been seen by PCP for today's on going symptoms /Per pt checks BG via fingerstick about every other day, fasting 162 today /Last A1c value was 12.5% done 8/19/2024)       Subjective:   DM hx:  -Diagnosed with diabetes Type 1 since age 12  -Family history- yes, strong diabetes family hx    Initial HPI consult in August 2024 08/19/24: Last office visit for DM mgmt of the pt: 5/2/24  Mgmt by: MARK Areans  Last time used omnipod was June, 2024 because it keeps on falling.   DM meds at first office visit:  - CURRENT: Humalog 1u:10g 2-3x a day ( lunch and dinner), 10-20 units mostly twice a day   -->last time he used his omnipod was June, 2024 (omnipod started  in the last year) , last time he used lantus was July, states his glucose keeps in going low that's why he stopped taking his lantus; the lowest units of lantus he used was 3 units, highest 7 units)   --> states tslim was not working for him but the longest he used was tslim  --->Per last rec by MARK Arenas -Lantus 7 units, Humalog dosing to ICR 1u:20g and ISF 1u:50 mg/dl for target of 120 mg/dl until insulin pump therapy resumed.     Blood Glucose log reviewed.: not testing glucose regularly, once every other day : lunch   pre-meal lunch:250-400 , episodes of hypoglycemia: Yes, ;lowest 53 after dinner   Feel symptoms at glucose level of: higher 60s  Hx of seizure r/d to hypoglycemia: denies   Have glucagon or Gvoke or Baqsimi: has glucagon     -Re: potential DM medication contraindication (if positive, checkbox selected):  [] History of pancreatitis- denies   [] Personal/fam hx of  medullary thyroid cancer/MEN2 - denies   [] History of recent/frequent UTI/yeast infxn- denie s  [] Previous amputation related to diabetes- denies    -Presence of associated DM complications (if positive, checkbox selected):  [] Macrovascular complications (CAD/CVA/PAD)- denies   [] Neuropathy- denies   [] Retinopathy denies, saw opthal 5 years ago   [] Nephropathy -denies  [] HTN  [] Hyperlipidemia  [] Stroke/TIA- denies   [] Gastroparesis denies  +DKA twice in the past, last time was teenage years, states last year was borderline.     -Lifestyle: works security, play basketball , works in the gym   - Modifying factors: non compliant w/ his treatment, per mom, pt was intermittently using his insulin pump in the last few months, pt does not like the pump as it keeps on falling from his arm w/ physical activities.     Previously trialed/failed DM meds:   T-slim & Medtronic pumps, Admelog - rash per patient, Humalog 50/50     Interim Hx with Flory APN 05/22/25  :    Here for follow up. Here with his mother. 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 He states he did not call our office about his medicine. Noted he had multiple appointments in the past that were cancelled and also that he did not show in his appointment. States he is running low of lantus,   Current treatment:  yesterday NPH 10 units twice daily , 2 days ago lantus 15 units ( in the afternoon),  today NPH 15 units in the morning,   Testing:  he reported this morning 162, once every other day he check his glucose:  162, 300s, 460s  Hypoglycemia: 62 , the lowest was about a week ago   Vision: cannot see far, back in HS the last time he saw opthalmology  Numbness and tingling to extremities:- denies   Wound:denies   Diet/Activities:doing boxing and working out. States doesn't eat much, eat  mostly once  a day only dinner.   New complication related to DM:   no hospitalization and no ER visit since last office visit.   Upcoming procedure or surgery:denies        History/Other:    Allergies, PMH, SocHx and FHx reviewed and updated as appropriate in Epic on    Continuous Glucose Sensor (DEXCOM G7 SENSOR) Does not apply Misc 1 each Every 10 days. 3 each 3    Urine Glucose-Ketones Test In Vitro Strip Use as needed, with ANY episode of VOMITING, during illness, and/or if 2+ BG >250 despite usual correction. 100 strip 0    glucagon (GVOKE HYPOPEN 2-PACK) 1 MG/0.2ML Subcutaneous injection Inject 0.2 mL (1 mg total) into the skin as needed. If glucose less than 70 and cannot swallow anything by mouth. Patient trained on Gvoke. Can substitute generic 0.4 mL 1    Insulin Lispro, 1 Unit Dial, (HUMALOG KWIKPEN) 100 UNIT/ML Subcutaneous Solution Pen-injector Inject 4 units + sliding scale provided 3 x a day before meals TDD max 32 units /day Actively titrating per endo instruction 30 mL 1    insulin glargine (LANTUS SOLOSTAR) 100 UNIT/ML Subcutaneous Solution Pen-injector Inject  9 units every morning subcutaneously. Actively titrating per endo instruction . TDD max 15 units per day 15 mL 0    Insulin Pen Needle (TRUEPLUS 5-BEVEL PEN NEEDLES) 32G X 4 MM Does not apply Misc Uses up to 5 times daily when not using insulin pump 150 each 1    insulin lispro 100 UNIT/ML Injection Solution Administer via insulin pump. Max TDD 60 units. 3 month supply. 60 mL 0    Insulin Disposable Pump (OMNIPOD 5 G6 INTRO, GEN 5,) Does not apply Kit 1 each continuous prn. 1 kit 0    Insulin Disposable Pump (OMNIPOD 5 G6 PODS, GEN 5,) Does not apply Misc 1 each every third day. 30 each 3    Continuous Glucose Transmitter (DEXCOM G6 TRANSMITTER) Does not apply Misc Change every 90 days 1 each 2    Continuous Glucose Sensor (DEXCOM G6 SENSOR) Does not apply Misc 1 each Every 10 days. Use as directed every 10 days 3 each 11    Continuous  Glucose Transmitter (DEXCOM G6 TRANSMITTER) Does not apply Misc 1 each every 3 (three) months. 1 each 1    Continuous Glucose Sensor (DEXCOM G6 SENSOR) Does not apply Misc 1 each Every 10 days. 9 each 1    Urine Glucose-Ketones Test In Vitro Strip Use as needed, with ANY episode of VOMITING, during illness, and/or if 2+ BG >250 despite usual correction. 100 strip 0    Insulin Syringe-Needle U-100 31G X 15/64\" 1 ML Does not apply Misc 1 Syringe in the morning, at noon, in the evening, and at bedtime. 360 each 1    Insulin Disposable Pump (OMNIPOD 5 G6 PODS, GEN 5,) Does not apply Misc 1 each every 3 (three) days. 10 each 5    OneTouch Delica Lancets 30G Does not apply Misc 1 each 4 (four) times daily. 200 each 11    Glucose Blood (ONETOUCH VERIO) In Vitro Strip 1 each by Finger stick route 4 (four) times daily. Check 3 times daily 200 strip 11    albuterol 108 (90 Base) MCG/ACT Inhalation Aero Soln Inhale 2 puffs into the lungs every 4 (four) hours as needed for Wheezing. 1 each 0    Urine Glucose-Ketones Test In Vitro Strip Use as directed during sick days 10 strip 5    Insulin Pen Needle (EASY TOUCH SAFETY PEN NEEDLES) 29G X 8MM Does not apply Misc QID with insulin injections 100 each 2     Allergies   Allergen Reactions    Admelog [Insulin Lispro, Human] RASH    Amoxicillin RASH     Current Outpatient Medications   Medication Sig Dispense Refill    Continuous Glucose Sensor (DEXCOM G7 SENSOR) Does not apply Misc 1 each Every 10 days. 3 each 3    Urine Glucose-Ketones Test In Vitro Strip Use as needed, with ANY episode of VOMITING, during illness, and/or if 2+ BG >250 despite usual correction. 100 strip 0    glucagon (GVOKE HYPOPEN 2-PACK) 1 MG/0.2ML Subcutaneous injection Inject 0.2 mL (1 mg total) into the skin as needed. If glucose less than 70 and cannot swallow anything by mouth. Patient trained on Gvoke. Can substitute generic 0.4 mL 1    Insulin Lispro, 1 Unit Dial, (HUMALOG KWIKPEN) 100 UNIT/ML Subcutaneous  Solution Pen-injector Inject 4 units + sliding scale provided 3 x a day before meals TDD max 32 units /day Actively titrating per endo instruction 30 mL 1    insulin glargine (LANTUS SOLOSTAR) 100 UNIT/ML Subcutaneous Solution Pen-injector Inject  9 units every morning subcutaneously. Actively titrating per endo instruction . TDD max 15 units per day 15 mL 0    Insulin Pen Needle (TRUEPLUS 5-BEVEL PEN NEEDLES) 32G X 4 MM Does not apply Misc Uses up to 5 times daily when not using insulin pump 150 each 1    insulin lispro 100 UNIT/ML Injection Solution Administer via insulin pump. Max TDD 60 units. 3 month supply. 60 mL 0    Insulin Disposable Pump (OMNIPOD 5 G6 INTRO, GEN 5,) Does not apply Kit 1 each continuous prn. 1 kit 0    Insulin Disposable Pump (OMNIPOD 5 G6 PODS, GEN 5,) Does not apply Misc 1 each every third day. 30 each 3    Continuous Glucose Transmitter (DEXCOM G6 TRANSMITTER) Does not apply Misc Change every 90 days 1 each 2    Continuous Glucose Sensor (DEXCOM G6 SENSOR) Does not apply Misc 1 each Every 10 days. Use as directed every 10 days 3 each 11    Continuous Glucose Transmitter (DEXCOM G6 TRANSMITTER) Does not apply Misc 1 each every 3 (three) months. 1 each 1    Continuous Glucose Sensor (DEXCOM G6 SENSOR) Does not apply Misc 1 each Every 10 days. 9 each 1    Urine Glucose-Ketones Test In Vitro Strip Use as needed, with ANY episode of VOMITING, during illness, and/or if 2+ BG >250 despite usual correction. 100 strip 0    Insulin Syringe-Needle U-100 31G X 15/64\" 1 ML Does not apply Misc 1 Syringe in the morning, at noon, in the evening, and at bedtime. 360 each 1    Insulin Disposable Pump (OMNIPOD 5 G6 PODS, GEN 5,) Does not apply Misc 1 each every 3 (three) days. 10 each 5    OneTouch Delica Lancets 30G Does not apply Misc 1 each 4 (four) times daily. 200 each 11    Glucose Blood (ONETOUCH VERIO) In Vitro Strip 1 each by Finger stick route 4 (four) times daily. Check 3 times daily 200 strip  11    albuterol 108 (90 Base) MCG/ACT Inhalation Aero Soln Inhale 2 puffs into the lungs every 4 (four) hours as needed for Wheezing. 1 each 0    Urine Glucose-Ketones Test In Vitro Strip Use as directed during sick days 10 strip 5    Insulin Pen Needle (EASY TOUCH SAFETY PEN NEEDLES) 29G X 8MM Does not apply Misc QID with insulin injections 100 each 2     Past Medical History:    Acute nasopharyngitis    Allergic rhinitis    Ankle pain    Backache    DKA, type 1 (HCC)    Earache    Fracture    Occult Fx    Rash    Sore throat    Type 1 diabetes mellitus (HCC)     Family History   Problem Relation Age of Onset    Other (htn) Mother     Other (lymphoma) Father     Diabetes Maternal Grandmother         type 2    Diabetes Maternal Grandfather     Brain Cancer Maternal Grandfather     Diabetes Maternal Aunt     Diabetes Maternal Uncle     Diabetes Maternal Cousin Female      Social history: Reviewed.      ROS/Exam    REVIEW OF SYSTEMS: Ten point review of systems has been performed and is otherwise negative and/or non-contributory, except as described above.     VITALS  Vitals:    05/22/25 1313   BP: 118/68   Pulse: 93   SpO2: 98%   Weight: 130 lb (59 kg)   Height: 5' 7\" (1.702 m)         Wt Readings from Last 6 Encounters:   05/22/25 130 lb (59 kg)   08/19/24 135 lb (61.2 kg)   08/19/24 135 lb (61.2 kg)   05/02/24 137 lb (62.1 kg)   10/03/23 136 lb (61.7 kg)   08/24/23 138 lb 9.6 oz (62.9 kg)       PHYSICAL EXAM  CONSTITUTIONAL:  awake, alert, cooperative, no apparent distress, and appears stated age. Vss stable   PSYCH: normal affect  LUNGS: breathing comfortably  CARDIOVASCULAR:  regular rate   NECK:  no palpable thyroid nodules     Labs/Imaging:   Lab Results   Component Value Date    CHOLEST 163 05/02/2024    TRIG 98 05/02/2024    HDL 67 (H) 05/02/2024    LDL 78 05/02/2024     Lab Results   Component Value Date    MICROALBCREA 23.7 05/02/2024    MICROALBCREA 6.2 07/20/2023      Lab Results   Component Value Date     CREATSERUM 0.85 08/19/2024    CREATSERUM 1.04 05/02/2024    EGFRCR 125 08/19/2024    EGFRCR 104 05/02/2024     Lab Results   Component Value Date    AST 44 (H) 08/19/2024    AST 23 05/02/2024    ALT 43 08/19/2024    ALT 34 05/02/2024       No results found for: \"TSH\", \"T4F\"    Overall glucose control:  Lab Results   Component Value Date    A1C 12.7 (A) 05/22/2025    A1C 12.5 (A) 08/19/2024    A1C 12.7 (A) 05/02/2024    A1C 14.0 (A) 08/24/2023    A1C 13.7 (A) 05/24/2023       Supplementary Documentation:   -Surveillance for Diabetes Complications & Risks  Foot exam/neuropathy: No data recorded    Retinopathy screening: No data recordedNo data recorded  Per pt, he saw opthal about 5 years ago, no DR.      Assessment & Plan:     ICD-10-CM    1. Type 1 diabetes mellitus with hyperglycemia, with long-term current use of insulin ([facility])  E10.65 Microalb/Creat Ratio, Random Urine     POC Hemoglobin A1C     Continuous Glucose Sensor (DEXCOM G7 SENSOR) Does not apply Misc     Urine Glucose-Ketones Test In Vitro Strip     glucagon (GVOKE HYPOPEN 2-PACK) 1 MG/0.2ML Subcutaneous injection     OP REFERRAL CONTINUOUS GLUCOSE MONITORING 2 HRS     Insulin Pen Needle (TRUEPLUS 5-BEVEL PEN NEEDLES) 32G X 4 MM Does not apply Misc     Basic Metabolic Panel (8)      2. Screening for nephropathy  Z13.89 Microalb/Creat Ratio, Random Urine      3. Hypertension associated with diabetes ([facility])  E11.59     I15.2       4. Mixed diabetic hyperlipidemia associated with type 1 diabetes mellitus ([facility])  E10.69 Lipid Panel    E78.2             Ángel Moscoso is a pleasant 24 year old male here for evaluation of:    #Diabetes- PMHx of Type 1 diabetes mellitus diagnosed since age 12.   -  Last A1c value was 12.7% done 5/22/2025.   -Discussed goal <7%. Importance of better glucose control in preventing onset/progression of end-organ damage discussed, as well as goals of therapy and clinical significance of A1C.  Plan:A1c above goal, Noted he had multiple  appointments in the past that were cancelled and also that he did not show in his appointment. States he is running low of lantus.  Discussed at length to him importance of following DM regimen below we discussed, as well as letting me know if he is running low of medicine so we can refill it appropriately. Discussed do not use  medicine as he was using his  NPH before. Discussed importance of SMBG as currently he is not checking his glucose regularly. No signs of DKA, he is aware to not miss any basal insulin in 24 hr window frame.  Discussed DKA symptoms and management and when to use the ketone strips if glucose becomes persistent above 250,  or  having nausea, vomiting and abd pain. I escript ketone strips. Ordered BMP. Patient prefers fixed dose than carb counting. Discussed benefits of carb counting with type 1 DM and he states he prefers fixed dose for now. Will re-evaluate it again on next visit. The last time he used NPH was this morning, will start lantus tomorrow morning but novolog to start tonight. Refilled meds as above.   - med changes:  Stop NPH   Lantus 9 units  every morning starting tomorrow morning   Novolog 4 units + sliding scale starting at dinner tonight 3 x a day before meals   Fixed dose for meal (4 units) + Correction scale:   Check your blood sugar before you eat.   Based on the number, give yourself a dose 15-20 minutes before the meal:  Correction scale   Less than 140 = 0 units  140-160= 1 units  161-180= 2 units  181-200= 3 units  201-220= 4 units  221-240= 5 units  241-260= 6 units  261-280= 7 units  281-300= 8 units  301-320= 9 units  321-340= 10 units  341-360= 11 units  361-380= 12 units  381-400= 13 units  400-420= 14 units  IF YOU ARE NOT EATING: do not give 4 units give the sliding scale 3x a day   IF not eating too high of carbs, take 2 units + sliding scale above   - do not give lispro less than 3 hrs apart  - - Recommended to patient to see  ophthalmology/optometrist, given the patient list of ophthalmology/optometrist  - start Dexcom G7 CGM with phone (connected to clinic profile)  - patient is on insulin, and has suboptimal glycemic control including wide glycemic swings, thus would benefit from CGM  - continue to check BG 3x/day using glucometer or CGM  - meet with endo DM educator re:CGM teaching  - gvoke sent   -See above header \"Supplementary Documentation\" for surveillance for diabetes complications & risks  Discussed targeted glucose levels and symptoms and treatment of hypoglycemia.  Documentation\" for surveillance for diabetes complications & risks    Nephropathy screening:   Lab Results   Component Value Date    EGFRCR 125 08/19/2024    MICROALBCREA 23.7 05/02/2024   No results found for: \"CREAUR\", \"MICROALBUMIN\", \"MALBCREACALC\"  Plan: Ordered urine MCR    Blood pressure control: - SBP is to goal <130   BP Readings from Last 1 Encounters:   05/22/25 118/68   BP Meds:      #Hyperlipidemia  Lipids: LDL is not to goal   Lab Results   Component Value Date    LDL 78 05/02/2024    TRIG 98 05/02/2024   Cholesterol Meds:     LDL is not to goal, agree for pt not to be on statin due to his age.   Plan: Ordered fasting lipid    Return in about 6 weeks (around 7/3/2025) for diabetes follow up.    QAMAR Hemphill  Endocrinology, Diabetes & Metabolism   05/22/25     Note to patient: The 21 Century Cures Act makes medical notes like these available to patients in the interest of transparency. However, be advised this is a medical document. It is intended as peer to peer communication. It is written in medical language and may contain abbreviations or verbiage that are unfamiliar. It may appear blunt or direct. Medical documents are intended to carry relevant information, facts as evident, and the clinical opinion of the practitioner.      In reviewing this note, please be advised that Dragon Voice Recognition software used to dictate the note may have made  errors in recognizing some of the words or phrases.

## (undated) NOTE — LETTER
Date: 11/8/2022    Patient Name: Eliz Corea        We have been trying to reach you regarding your diabetes care. Uncontrolled diabetes can have devastating consequences and we care about your health. We have been unable to reach you via phone. Please contact our office as soon as possible. If you are without insurance there are clinics you can be seen at and insulin that can be purchased over the counter.           Sincerely,    QMAAR Ness

## (undated) NOTE — LETTER
November 5, 2017    Patient: Christine Carney   Date of Visit: 11/5/2017       To Whom It May Concern:    Christine Carney was seen and treated in our emergency department on 11/5/2017. He can return to school.     If you have any questions or concerns, jatin

## (undated) NOTE — LETTER
November 5, 2017    Patient: Flavio Del Valle   Date of Visit: 11/5/2017       To Whom It May Concern:    Flavio Del Valle was seen and treated in our emergency department on 11/5/2017. He should not return to school until Tuesday, 11/7.     If you have any q

## (undated) NOTE — LETTER
10/05/23  Re: Maurice Moscoso     2000  Medicaid Blue [facility] Replacement  ID [id number]        To Whom It May Concern:    Maurice Moscoso (2000) is under my care for the management of his Type 1 Diabetes Mellitus. This is a request for approval for OmniPod 5 system and supplies, an automated insulin pump integrated with the Dexcom Continuous Glucose Monitor, to improve the management of his diabetes. Per The American Diabetes Association Standards of Care, automated insulin delivery systems should be offered for diabetes management for youth and adults with type 1 diabetes who are capable of using the device. Ángel's current A1c is 14% (2023) which makes him at extremely high risk for hospitalizations, complications and even death. We are requesting approval for OmniPod 5, a tubeless pump due to Ángel's previous usage of tubed pumps, ie Tandem and Medtronic, that were unsuccessful due to tubing issues. With the OmniPod 5 being tubeless and waterproof he will not need to remove the device when showering or swimming and will avoid tubing issues with activity or work which will result in improved and more consistent management. Attached is Ángel's most recent office visit note and A1c% report. Please contact the office if any further questions.         Sincerely,          Hansa FOOTE, BC-ADM, [facility]

## (undated) NOTE — LETTER
02/26/24  At [facility], patient care is our priority.  To best serve our patients, our mission is to be a partner in your healthcare.  Just as you have expectations of your providers and their office staff, they have expectations of you, their patient.  We expect that you will always communicate honestly and openly.  Once a plan of care is developed, we expect that you will follow the reasonable recommendations of your provider.  We will do our best to work with you to overcome any barriers that  your way from meeting your provider's expectations.    Your last appointment with us was on 10/03/2023  . At that time we asked that you follow up with our office on  2 weeks. You have yet to do so. We have made several attempts to contact you but we have not had success.     If you have established care with another diabetes care provider,  please contact our office so we may update your records.     Please accept this letter as a warning that if we do not hear from you within two weeks from the date on this letter that your diabetes care will be transferred back to your primary care office. After that time, your diabetes medication refills will need to be obtained from their office.         Sincerely,           Paris FOOTE, BC-ADM, [facility]

## (undated) NOTE — LETTER
Date & Time: 5/20/2021, 2:58 PM  Patient: Fe Chandler  Encounter Provider(s):    Benjy Peter MD       To Whom It May Concern:    Sara Bernard was seen and treated in our department on 5/20/2021.  He can return to work with these limitations:

## (undated) NOTE — LETTER
08/01/24      Via Certified Mail and Postal Mail     Ángel Bailey  [address] [place] [address]    Medical Record #:  [id number]      Dear Ángel,    The [facility] would like to notify you that moving forward, we will no longer be able to provide care for your diabetes. It is now important to establish care with an Endocrinologist. We thank you for trusting us with your care during our time together. We are confident that you will continue to thrive in your diabetes management with this next step.      An Endocrinologist is a specialized provider that can now manage your diabetes.  We understand that this change can be difficult, but it is a necessary step forward for your health.      It is important to work with an Endocrinologist to manage your diabetes and your medication.  The providers of [facility] will provide urgent medical services and medication refill to you for up to 30 days from the date of this letter. After this time, you will not be able to schedule an appointment or obtain refills.     Thank you for your understanding. [facility] is excited to continue to partner in your healthcare through our Endocrinology Department [phone].         Sincerely,          Paris FOOTE, BC-ADM, [facility]

## (undated) NOTE — ED AVS SNAPSHOT
Vivienne Don   MRN: [id number]    Department:  [facility] Emergency Department   Date of Visit:  11/5/2017           Disclosure     Insurance plans vary and the physician(s) referred by the ER may not be covered by your plan.  Please contact your If you have been prescribed any medication(s), please fill your prescription right away and begin taking the medication(s) as directed    If the emergency physician has read X-rays, these will be re-interpreted by a radiologist.  If there is a significant

## (undated) NOTE — ED AVS SNAPSHOT
Alexismarisol Cordero   MRN: [id number]    Department:  [facility] Emergency Department   Date of Visit:  9/5/2017           Disclosure     Insurance plans vary and the physician(s) referred by the ER may not be covered by your plan.  Please contact your i If you have been prescribed any medication(s), please fill your prescription right away and begin taking the medication(s) as directed    If the emergency physician has read X-rays, these will be re-interpreted by a radiologist.  If there is a significant

## (undated) NOTE — LETTER
Date: 12/13/2022    Patient Name: Michelle Crocker          To Whom it may concern: This letter has been written at the patient's request. The above patient was seen at the [facility] for treatment of a medical condition. This patient should be excused from attending work from 12/13/22 through 12/20/22. The patient may return to work on 12/21/22 with the following limitations none.         Sincerely,    QAMAR Tam